# Patient Record
Sex: FEMALE | Race: WHITE | Employment: OTHER | URBAN - METROPOLITAN AREA
[De-identification: names, ages, dates, MRNs, and addresses within clinical notes are randomized per-mention and may not be internally consistent; named-entity substitution may affect disease eponyms.]

---

## 2017-10-30 PROBLEM — N64.52 NIPPLE DISCHARGE IN FEMALE: Status: ACTIVE | Noted: 2017-10-16

## 2017-12-28 PROBLEM — N64.52 NIPPLE DISCHARGE IN FEMALE: Status: RESOLVED | Noted: 2017-10-16 | Resolved: 2017-12-28

## 2018-03-22 ENCOUNTER — HOSPITAL ENCOUNTER (OUTPATIENT)
Dept: RADIATION ONCOLOGY | Age: 58
Discharge: HOME | End: 2018-03-22
Payer: COMMERCIAL

## 2018-03-22 VITALS
RESPIRATION RATE: 18 BRPM | SYSTOLIC BLOOD PRESSURE: 122 MMHG | DIASTOLIC BLOOD PRESSURE: 82 MMHG | BODY MASS INDEX: 28.69 KG/M2 | WEIGHT: 159.4 LBS

## 2018-03-22 DIAGNOSIS — C80.1 CANCER (HCC): Primary | ICD-10-CM

## 2018-03-22 PROCEDURE — 99213 OFFICE O/P EST LOW 20 MIN: CPT | Performed by: RADIOLOGY

## 2018-03-22 PROCEDURE — 99212 OFFICE O/P EST SF 10 MIN: CPT

## 2018-03-22 NOTE — PROGRESS NOTES
Win Christensen  3/22/2018  10:23 AM      Vitals:    03/22/18 1021   BP: 122/82   Resp: 18    : Wt Readings from Last 1 Encounters:   03/22/18 159 lb 6.4 oz (72.3 kg)                Current Outpatient Prescriptions:     vitamin D (ERGOCALCIFEROL) 51205 units CAPS capsule, Take 1 capsule by mouth once a week, Disp: 12 capsule, Rfl: 3    Evening Primrose Oil 1000 MG CAPS, Take 1,000 mg by mouth daily, Disp: , Rfl:     calcium carbonate (OSCAL) 500 MG TABS tablet, Take 500 mg by mouth daily , Disp: , Rfl:     Lactobacillus-Inulin (CULTUREE [employer] HEALTH PO), Take by mouth, Disp: , Rfl:     exemestane (AROMASIN) 25 MG chemo tablet, Take 25 mg by mouth daily, Disp: , Rfl:     aspirin 81 MG tablet, Take 81 mg by mouth three times a week, Disp: , Rfl:       Patient is seen today in follow up-3 month  Here for regular follow up for left breast cancer/radiation. She states she is doing good. She follows with Dr Rosalba Downing for Medical Oncology and is on Aromasin. She had a mammogram 12/28/17 that was negative. FALLS RISK SCREEN  Instructions:  Assess the patient and enter the appropriate indicators that are present for fall risk identification. Total the numbers entered and assign a fall risk score from Table 2.  Reassess patient at a minimum every 12 weeks or with status change. Assessment   Date  3/22/2018   1. Mental Ability: confusion/cognitively impaired 0 (3)   2. Elimination Issues: incontinence, frequency 0 (3)   3. Ambulatory: use of assistive devices (walker, cane, off-loading devices),        attached to equipment (IV pole, oxygen) 0 (2)   4.  Sensory Limitations: dizziness, vertigo, impaired vision 0 (3)   5. Age less than 65                 0 (0)   6. Age 72 or greater 0 (1)   7. Medication: diuretics, strong analgesics, hypnotics, sedatives,        antihypertensive agents 0 (3)   8.   Falls:  recent history of falls within the last 3 months (not to include slipping or tripping) 0 (7)   TOTAL 0  If score of 4 or greater was education given? No         TABLE 2   Risk Score Risk Level Plan of Care   0-3 Little or  No Risk 1. Provide assistance as indicated for ambulation activities  2. Reorient confused/cognitively impaired patient  3. Call-light/bell within patient's reach  4. Chair/bed in low position, stretcher/bed with siderails up except when performing patient care activities  5. Educate patient/family/caregiver on falls prevention  6.  Reassess in 12 weeks or with any noted change in patient condition which places them at a risk for a fall   4-6 Moderate Risk 1. Provide assistance as indicated for ambulation activities  2. Reorient confused/cognitively impaired patient  3. Call-light/bell within patient's reach  4. Chair/bed in low position, stretcher/bed with siderails up except when performing patient care activities  5. Educate patient/family/caregiver on falls prevention  6. Falls risk precaution (Yellow sticker Level II) placed on patient chart   7 or   Higher High Risk 1. Place patient in easily observable treatment room  2. Patient attended at all times by family member or staff  3. Provide assistance as indicated for ambulation activities  4. Reorient confused/cognitively impaired patient  5. Call-light/bell within patient's reach  6. Chair/bed in low position, stretcher/bed with siderails up except when performing patient care activities  7. Educate patient/family/caregiver on falls prevention  8. Falls risk precaution (Yellow sticker Level III) placed on patient chart       NUTRITION RISK SCREEN    3/22/2018   Patient:  dEi Cazares  Sex:  female    NUTRITION RISK SCREEN  Instructions:  Assess the patient and enter the appropriate indicators that are present for nutrition risk identification. Total the numbers entered and assign a risk score. Follow the appropriate action for total score listed below.      Assessment   Date  3/22/2018 1. Poor appetite?--a. One week or greater (1)                       b. One month or greater (2) 0        2. Unplanned wt loss?--a. Greater than 5# in 1 week(1)                                  b. Greater than 10# in 1 month (2)                                  c. Greater than 20# in 6 mos (1) 0        3. Diagnosis of Head or Neck Cancer? (2)   0    4. If yes, difficulty swallowing? (1) 0        5. Receiving nutrition via feeding tube or parenteral nutrition? (1) 0        6. If yes, report of problems with feeding tube? (1) 0      TOTAL 0         Score of 0-1: No action  Score 2 or greater:  1. For Non-Diabetic Patient: Recommend adding Ensure Complete 2xdaily and provide              patient with Ensure wellness bag with coupons; For Diabetic Patient, Recommend adding Glucerna Shake 2xdaily and provide patient with Glucerna Wellness bag with coupons  2.  Route to the dietitian via [address] [place] [address]

## 2018-03-22 NOTE — PROGRESS NOTES
breast cancer:  cousin female, breast cancer. The patient has a history of    left Stereotactic Biopsy in December, 2016 - dcis.       MAMMOGRAM VIEWS:   The following mammographic views where obtained: bilateral craniocaudal; bilateral craniocaudal with tomosynthesis; bilateral mediolateral oblique; and bilateral mediolateral oblique with tomosynthesis       TOMOSYNTHESIS:   Tomosynthesis (3 Dimensional Breast Imaging) was used on this examination to aid in evaluation.       COMPARISON:   The present examination has been compared to prior imaging studies performed at [facility] on 12/27/2016 and 10/30/2017.       CAD:   This exam was reviewed using the [employer] Computer Aided Detection (CAD)       TISSUE DENSITY:   There are scattered fibroglandular densities (Type 2 density).       MAMMOGRAM FINDINGS:   Finding 1:   There is an area of post-lumpectomy change seen in the left breast.       No suspicious masses, areas of suspicious architectural distortion, suspicious calcifications, or additional suspicious findings are identified.       IMPRESSION:   Area of post-lumpectomy change in the left breast is benign.       Screening mammogram in 1 year is recommended.       =======================================   BI-RADS Category 2:  Benign   =======================================                -----       RADON TX SUMM 2177 Manny Allred MD:        Early stage L breast CA (Tis) s/p BCS< RT as below        Katia Kilpatrick completed radiation treatments to the left whole breast with mixed mode photons using a FiF/ [place] technique at [facility].   The patient received a total dose of 42.56 in 16 fractions + conformal LSB.     Treatment Start Date:  3/29/17  Treatment Completion Date: 4/28/17     The treatments were well tolerated, without significant interruption.      RTOG Acute Toxicity Grade [ARMSC]: 1-2. (skin and fatigue)        -----    Pathology reviewed:     Initial BCS site  Ancillary Studies:  ER performed on original biopsy [facility]  #[id number], [facility] #[id number]), positive (90%, moderate intensity)  Microcalcifications:  A few small calcifications present in DCIS and  areas of fibrocystic change  Clinical History:  Not known        Re-excision PATH 2/10/17:  Diagnosis:  Left breast designated \"new superior-anterior margin\", wide local  excision:     * Negative for residual malignancy.     * Focal fibrocystic changes.     * Focal proliferative epithelial changes including ductal epithelial       hyperplasia of usual type and adenosis.     * Histologic changes consistent with healing biopsy site.         BIOP 12/5/17:  Diagnosis:  Left breast, retroareolar region, microcalcifications, core needle biopsy  (6 slides from [address] [place] [address] number: [id number])       - Ductal carcinoma in situ, high nuclear grade, comedo and solid         type with necrosis;       - Microcalcifications associated with DCIS;       - Estrogen receptors (ER): Positive, > 90% nuclear staining,         moderate staining intensity.       -----         Past Medical History:   Diagnosis Date    Cancer ([address]     left    mammogram 11/03/2016    erick Kingsley-scattered fibroglandular tissue bilaterally-follow up spot compression is recommended    Plantar fascia syndrome          Past Surgical History:   Procedure Laterality Date    BREAST SURGERY Left 02/10/2017    Excision left breast anterior superior margins with Dr. Fabio Manley COLONOSCOPY  2012    ENDOMETRIAL ABLATION      WISDOM TOOTH EXTRACTION           Social History     Social History    Marital status:      Spouse name: N/A    Number of children: N/A    Years of education: N/A     Occupational History    Not on file.      Social History Main Topics    Smoking status: Never Smoker    Smokeless tobacco: Never Used    Alcohol use No    Drug use: No    Sexual activity: Yes     Partners: Male     Other Topics Concern    Not on file     Social History Narrative    Lives in [place], works as a groomer. Family History   Problem Relation Age of Onset    Cancer Father 47     bladder    Cancer Maternal Grandmother [de-identified]     pancreatic    Cancer Maternal Grandfather 79     lung    Cancer Paternal Cousin 54     breast       Allergies:   Dust mite extract and Molds & smuts      Current Outpatient Prescriptions   Medication Sig Dispense Refill    vitamin D (ERGOCALCIFEROL) 81998 units CAPS capsule Take 1 capsule by mouth once a week 12 capsule 3    Evening Primrose Oil 1000 MG CAPS Take 1,000 mg by mouth daily      calcium carbonate (OSCAL) 500 MG TABS tablet Take 500 mg by mouth daily       Lactobacillus-Inulin ([employer] HEALTH PO) Take by mouth      exemestane (AROMASIN) 25 MG chemo tablet Take 25 mg by mouth daily      aspirin 81 MG tablet Take 81 mg by mouth three times a week       No current facility-administered medications for this encounter. REVIEW OF SYSTEMS  History obtained from chart review and the patient  General ROS: negative  Psychological ROS: negative  Ophthalmic ROS: negative  ENT ROS: negative  Allergy and Immunology ROS: negative  Hematological and Lymphatic ROS: negative  Endocrine ROS: negative  Breast ROS: negative for breast lumps  Respiratory ROS: no cough, shortness of breath, or wheezing  Cardiovascular ROS: no chest pain or dyspnea on exertion  Gastrointestinal ROS: no abdominal pain, change in bowel habits, or black or bloody stools  Genito-Urinary ROS: no dysuria, trouble voiding, or hematuria  Musculoskeletal ROS: negative  Neurological ROS: no TIA or stroke symptoms  Dermatological ROS: negative        Physical Exam   Constitutional: She is oriented to person, place, and time and well-developed, well-nourished, and in no distress. HENT:   Head: Normocephalic. Eyes: Conjunctivae are normal. Pupils are equal, round, and reactive to light.    Neck: Normal range of motion. Neck supple. Cardiovascular: Normal rate. Pulmonary/Chest: Effort normal and breath sounds normal.   Abdominal: Soft. Bowel sounds are normal.   Musculoskeletal: Normal range of motion. She exhibits no edema. Neurological: She is alert and oriented to person, place, and time. Skin: Skin is warm and dry. Psychiatric: Mood, memory, affect and judgment normal.           A/P:        Herve Diaz is doing well post adjuvant fractionated external beam radiation therapy for left breast DCIS (after BCS). There is no evidence of disease recurrence or progression based on a detailed review of the available imaging, physical exam, and ROS (all personally performed prior to and during this follow up appointment today). The patient did verbalize understanding for the indications of continued FU including imaging and laboratory evaluation as applicable to this case; as well as appropriate lifestyle choices and health maintenance. All questions answered to the best of my ability. Early delayed or chronic RT grade 1 (skin).          *I spent at least 35 on this case and with this patient today including personally performing/reviewing the history, ROS, PE, and providing a summary and description of the detailed medical decision making as a basis for any and all recommendations made today (+/- a pertinent RBA discussion): literature and radiology reviews were performed as noted and applicable (61% or more time was spent in direct patient ).          -needs Mgm 6 months post RT (recent NEG, next is ordered per TM)  -also follows with Dr. Felicita Contreras. Jenna Acosta MD [place] [address] Oncology  Cell: [phone]  [facility]:  [phone]   FAX: [phone]  [address] [place] [address]:   [address] [place] [address] Avenue:    [phone]  [address] [place] [address] Road:  [phone]   FAX:  [phone]  Email: [email or web address]      NOTE: This report was transcribed using voice recognition software.  Every effort was made to ensure accuracy; however, inadvertent computerized transcription errors may be present.

## 2018-06-21 ASSESSMENT — ENCOUNTER SYMPTOMS
SINUS PAIN: 0
TROUBLE SWALLOWING: 0
ABDOMINAL DISTENTION: 0
SINUS PRESSURE: 0
ABDOMINAL PAIN: 0
EYE DISCHARGE: 0
RHINORRHEA: 0
VOMITING: 0
SHORTNESS OF BREATH: 0
EYE ITCHING: 0
CHEST TIGHTNESS: 0
WHEEZING: 0
VOICE CHANGE: 0
DIARRHEA: 0
CHOKING: 0
BLOOD IN STOOL: 0
COUGH: 0
BACK PAIN: 0
SORE THROAT: 0
CONSTIPATION: 0
NAUSEA: 0

## 2018-06-28 ENCOUNTER — OFFICE VISIT (OUTPATIENT)
Dept: BREAST CENTER | Age: 58
End: 2018-06-28
Payer: COMMERCIAL

## 2018-06-28 VITALS
RESPIRATION RATE: 16 BRPM | TEMPERATURE: 98.4 F | HEIGHT: 63 IN | DIASTOLIC BLOOD PRESSURE: 80 MMHG | WEIGHT: 153.5 LBS | HEART RATE: 59 BPM | SYSTOLIC BLOOD PRESSURE: 120 MMHG | OXYGEN SATURATION: 98 % | BODY MASS INDEX: 27.2 KG/M2

## 2018-06-28 DIAGNOSIS — Z85.3 PERSONAL HISTORY OF BREAST CANCER: ICD-10-CM

## 2018-06-28 DIAGNOSIS — D05.12 DUCTAL CARCINOMA IN SITU (DCIS) OF LEFT BREAST: Primary | ICD-10-CM

## 2018-06-28 PROCEDURE — 99213 OFFICE O/P EST LOW 20 MIN: CPT | Performed by: NURSE PRACTITIONER

## 2018-10-02 ENCOUNTER — HOSPITAL ENCOUNTER (OUTPATIENT)
Dept: RADIATION ONCOLOGY | Age: 58
Discharge: HOME | End: 2018-10-02
Payer: COMMERCIAL

## 2018-10-02 VITALS
WEIGHT: 156 LBS | HEART RATE: 72 BPM | RESPIRATION RATE: 18 BRPM | SYSTOLIC BLOOD PRESSURE: 132 MMHG | BODY MASS INDEX: 28.08 KG/M2 | DIASTOLIC BLOOD PRESSURE: 80 MMHG

## 2018-10-02 DIAGNOSIS — D05.12 DUCTAL CARCINOMA IN SITU (DCIS) OF LEFT BREAST: Primary | ICD-10-CM

## 2018-10-02 PROCEDURE — 99213 OFFICE O/P EST LOW 20 MIN: CPT | Performed by: NURSE PRACTITIONER

## 2018-10-10 ENCOUNTER — HOSPITAL ENCOUNTER (OUTPATIENT)
Age: 58
Discharge: HOME | End: 2018-10-12
Payer: COMMERCIAL

## 2018-10-10 PROCEDURE — 88175 CYTOPATH C/V AUTO FLUID REDO: CPT

## 2018-12-31 ENCOUNTER — HOSPITAL ENCOUNTER (OUTPATIENT)
Dept: GENERAL RADIOLOGY | Age: 58
End: 2018-12-31
Payer: COMMERCIAL

## 2018-12-31 ENCOUNTER — HOSPITAL ENCOUNTER (OUTPATIENT)
Dept: GENERAL RADIOLOGY | Age: 58
Discharge: HOME | End: 2019-01-02
Payer: COMMERCIAL

## 2018-12-31 DIAGNOSIS — Z12.31 SCREENING MAMMOGRAM, ENCOUNTER FOR: ICD-10-CM

## 2018-12-31 DIAGNOSIS — D05.12 DUCTAL CARCINOMA IN SITU (DCIS) OF LEFT BREAST: ICD-10-CM

## 2018-12-31 PROCEDURE — 77063 BREAST TOMOSYNTHESIS BI: CPT

## 2019-01-02 DIAGNOSIS — Z78.0 POSTMENOPAUSAL: Primary | ICD-10-CM

## 2019-01-11 ASSESSMENT — ENCOUNTER SYMPTOMS
SHORTNESS OF BREATH: 0
CHOKING: 0
CHEST TIGHTNESS: 0
EYE ITCHING: 0
SINUS PAIN: 0
BACK PAIN: 0
ABDOMINAL PAIN: 0
VOMITING: 0
BLOOD IN STOOL: 0
COUGH: 0
CONSTIPATION: 0
SORE THROAT: 0
RHINORRHEA: 0
EYE DISCHARGE: 0
NAUSEA: 0
VOICE CHANGE: 0
DIARRHEA: 0
TROUBLE SWALLOWING: 0
ABDOMINAL DISTENTION: 0
WHEEZING: 0
SINUS PRESSURE: 0

## 2019-01-17 ENCOUNTER — OFFICE VISIT (OUTPATIENT)
Dept: BREAST CENTER | Age: 59
End: 2019-01-17
Payer: COMMERCIAL

## 2019-01-17 ENCOUNTER — HOSPITAL ENCOUNTER (OUTPATIENT)
Dept: GENERAL RADIOLOGY | Age: 59
Discharge: HOME | End: 2019-01-19
Payer: COMMERCIAL

## 2019-01-17 VITALS
BODY MASS INDEX: 27.82 KG/M2 | DIASTOLIC BLOOD PRESSURE: 84 MMHG | WEIGHT: 157 LBS | SYSTOLIC BLOOD PRESSURE: 138 MMHG | TEMPERATURE: 98 F | HEIGHT: 63 IN | RESPIRATION RATE: 16 BRPM | HEART RATE: 59 BPM | OXYGEN SATURATION: 98 %

## 2019-01-17 DIAGNOSIS — Z78.0 POSTMENOPAUSAL: ICD-10-CM

## 2019-01-17 DIAGNOSIS — D05.12 DUCTAL CARCINOMA IN SITU (DCIS) OF LEFT BREAST: Primary | ICD-10-CM

## 2019-01-17 PROCEDURE — 77080 DXA BONE DENSITY AXIAL: CPT

## 2019-01-17 PROCEDURE — 99213 OFFICE O/P EST LOW 20 MIN: CPT | Performed by: NURSE PRACTITIONER

## 2019-01-17 RX ORDER — CHLORAL HYDRATE 500 MG
CAPSULE ORAL DAILY
COMMUNITY

## 2019-04-26 ENCOUNTER — HOSPITAL ENCOUNTER (OUTPATIENT)
Dept: RADIATION ONCOLOGY | Age: 59
Discharge: HOME | End: 2019-04-26
Payer: COMMERCIAL

## 2019-04-26 VITALS
SYSTOLIC BLOOD PRESSURE: 130 MMHG | RESPIRATION RATE: 16 BRPM | TEMPERATURE: 97.7 F | HEART RATE: 74 BPM | DIASTOLIC BLOOD PRESSURE: 78 MMHG | BODY MASS INDEX: 27.9 KG/M2 | WEIGHT: 157.5 LBS

## 2019-04-26 DIAGNOSIS — C80.1 CANCER (HCC): Primary | ICD-10-CM

## 2019-04-26 PROCEDURE — 99212 OFFICE O/P EST SF 10 MIN: CPT

## 2019-04-26 PROCEDURE — 99213 OFFICE O/P EST LOW 20 MIN: CPT | Performed by: RADIOLOGY

## 2019-04-26 NOTE — PROGRESS NOTES
Radiation Oncology   Follow Up Note  Percy Vela. Simba Self MD MS DABR      2019  Coco Reardon  Date of Service: 19        HPI:           Mrs. Coco Reardon is a pleasant and articulate 55year old  who presents today for another  FU post RT.                       The microcalcifications were located in the retro areolar position of the left breast. Patient did not routinely do self breast exams. Patient reported small amount of yellow/green nipple discharge ever since she breast fed. The discharge only occurred with pressure. Breast cancer risk factors included family hx on father's side, first cousin ---Possibly also that cousin's Mother. Carmen Black father who was a smoker had bladder cancer. Maternal GF with lung cancer, smoker. MGM with late onset pancreatic cancer.  Age of menarche was 14. Patient had a uterine ablation 5-6 years ago. Davie Sharma denies hormonal therapy. Patient is . Age of first live birth was 35. Patient did breast feed. She underwent a stereotactic left breast core biopsy at the retro areolar position with clip placement on . [facility] demonstrated DCIS. Estrogen Receptor positive. Patient denied prior breast biopsy. BCS and re-resection performed. DCIS only but high grade. Course reviewed prior to RT and she tolerated fractionated external beam radiation therapy very well (as below).  She has been compliant anti - estrogen therapy (SE - hot flashes).  Franca states that the left breat is still mildly tender but there are no masses, lesions or discharge. Kristin Zhou is now doing self breast exams more frequently and she still notes no changes. No new Sx on this last interval.  KPS 90+.         Mgm 18:      IMPRESSION:   No mammographic evidence of malignancy.       Screening mammogram in 1 year is recommended.       =======================================   BI-RADS Category 1:  Negative LSB.     Treatment Start Date:  3/29/17  Treatment Completion Date: 4/28/17     The treatments were well tolerated, without significant interruption.      RTOG Acute Toxicity Grade [ARMSC]: 1-2. (skin and fatigue)          -----         Pathology reviewed:     Initial BCS 1/12/17  Diagnosis:     A. Soft tissue mass, right shoulder, excision:  Lipoma.     B. Skin lesions, excision:  Acrochordons (skin tags), one with small       intradermal nevus.     C. Left breast tissue, wire-guided excision (lumpectomy):       - Scattered foci of residual high-grade ductal carcinoma in situ,         predominantly solid type with focal comedo necrosis, focally         involving anterior (green ink) margin of excision and focally         extending to within 1 mm of jarvis-superior (green/red ink) margin         of excision.       - Recent biopsy site with organizing hematoma/fat necrosis.      - Proliferative fibrocystic changes.     D. Left breast tissue, anterior margin, excision:  Breast tissue with       fibrocystic changes and peripheral electro thermal artifact, negative       for residual ductal carcinoma in situ.     Comment:     The wire guided lumpectomy specimen contains numerous foci  of residual high-grade ductal carcinoma in situ with focal cancerization  of lobules.  Ductal carcinoma in situ is focally present at the anterior  margin and is present within 1 mm of the adjacent anterosuperior margin  of excision.    The additional anterior margin tissue is negative for  ductal carcinoma in situ.                 CANCER CASE SUMMARY  Procedure:  Lumpectomy with additional anterior margin  Lymph Node Sampling:  Not performed  Specimen Laterality:  Left  Tumor Site:  Retroareolar  Size (Extent) of DCIS:  Multifocal, DCIS present in 7 of 23 sections  Histologic Type:  High-grade DCIS  Architectural Patterns :  Predominantly solid, with small foci of  micropapillary  Nuclear thGthrthathdtheth:th th4th Necrosis:  Focal comedo necrosis present  Margins:  DCIS focally present at anterior margin (green ink)  Treatment Effect:  Recent biopsy site present  Lymph Nodes:  No lymph node sampling  Pathologic Staging:   pTis  Additional Pathologic Findings:  Proliferative fibrocystic changes,  recent biopsy site  Ancillary Studies:  ER performed on original biopsy [facility]  #[id number], [facility] #[id number]), positive (90%, moderate intensity)  Microcalcifications:  A few small calcifications present in DCIS and  areas of fibrocystic change  Clinical History:  Not known          Re-excision PATH 2/10/17:  Diagnosis:  Left breast designated \"new superior-anterior margin\", wide local  excision:     * Negative for residual malignancy.     * Focal fibrocystic changes.     * Focal proliferative epithelial changes including ductal epithelial       hyperplasia of usual type and adenosis.     * Histologic changes consistent with healing biopsy site.           BIOP 12/5/17:  Diagnosis:  Left breast, retroareolar region, microcalcifications, core needle biopsy  (6 slides from [address] [place] [address] number: [id number])       - Ductal carcinoma in situ, high nuclear grade, comedo and solid         type with necrosis;       - Microcalcifications associated with DCIS;       - Estrogen receptors (ER): Positive, > 90% nuclear staining,         moderate staining intensity.          -----                  Past Medical History:   Diagnosis Date    Cancer ([address]     left    Carpal tunnel syndrome of right wrist 07/2018    mammogram 11/03/2016    erick Kingsley-scattered fibroglandular tissue bilaterally-follow up spot compression is recommended    Plantar fascia syndrome          Past Surgical History:   Procedure Laterality Date    BREAST SURGERY Left 02/10/2017    Excision left breast anterior superior margins with Dr. Evelyn Bailey Right 11/27/2018    COLONOSCOPY  2012    ENDOMETRIAL ABLATION      WISDOM TOOTH EXTRACTION mg by mouth daily      Elastic Bandages & Supports (ACE WRIST BRACE) MISC 1 Units by Does not apply route daily 1 each 0    Evening Primrose Oil 1000 MG CAPS Take 1,000 mg by mouth daily      calcium carbonate (OSCAL) 500 MG TABS tablet Take 500 mg by mouth 2 times daily       exemestane (AROMASIN) 25 MG chemo tablet Take 25 mg by mouth daily      aspirin 81 MG tablet Take 81 mg by mouth three times a week       No current facility-administered medications for this encounter. REVIEW OF SYSTEMS  History obtained from chart review and the patient  General ROS: negative  Psychological ROS: negative  Ophthalmic ROS: negative  ENT ROS: ringing in the ears  Allergy and Immunology ROS: negative  Hematological and Lymphatic ROS: negative  Endocrine ROS: negative  Breast ROS: negative for breast lumps  Respiratory ROS: no cough, shortness of breath, or wheezing  Cardiovascular ROS: no chest pain or dyspnea on exertion  Gastrointestinal ROS: no abdominal pain, change in bowel habits, or black or bloody stools  Genito-Urinary ROS: no dysuria, trouble voiding, or hematuria  Musculoskeletal ROS: negative  Neurological ROS: no TIA or stroke symptoms  Dermatological ROS: negative      Physical Exam   Constitutional: She is oriented to person, place, and time. She appears well-developed and well-nourished. HENT:   Head: Normocephalic. Eyes: Pupils are equal, round, and reactive to light. Neck: Normal range of motion. Cardiovascular: Normal rate, regular rhythm and normal heart sounds. Pulmonary/Chest: Effort normal.   Abdominal: Soft. Musculoskeletal: Normal range of motion. She exhibits no edema. Neurological: She is alert and oriented to person, place, and time. Skin: Skin is warm and dry. Psychiatric: She has a normal mood and affect.  Her behavior is normal. Judgment and thought content normal.         A/P:             Altagracia Kenney is doing well post adjuvant fractionated external beam radiation therapy

## 2019-04-26 NOTE — PROGRESS NOTES
Makayla Begum  4/26/2019  9:45 AM      Vitals:    04/26/19 0944   BP: 130/78   Pulse: 74   Resp: 16   Temp: 97.7 °F (36.5 °C)    : Wt Readings from Last 3 Encounters:   04/26/19 157 lb 8 oz (71.4 kg)   01/17/19 157 lb (71.2 kg)   10/10/18 157 lb (71.2 kg)                Current Outpatient Medications:     vitamin D (ERGOCALCIFEROL) 09144 units CAPS capsule, TAKE 1 CAPSULE ONCE WEEKLY (PATIENT NEEDS APPOINTMENT), Disp: 12 capsule, Rfl: 0    Omega-3 1000 MG CAPS, Take by mouth daily, Disp: , Rfl:     MAGNESIUM GLUCONATE PO, Take 150 mg by mouth daily, Disp: , Rfl:     Elastic Bandages & Supports (ACE WRIST BRACE) MISC, 1 Units by Does not apply route daily, Disp: 1 each, Rfl: 0    Evening Primrose Oil 1000 MG CAPS, Take 1,000 mg by mouth daily, Disp: , Rfl:     calcium carbonate (OSCAL) 500 MG TABS tablet, Take 500 mg by mouth 2 times daily , Disp: , Rfl:     exemestane (AROMASIN) 25 MG chemo tablet, Take 25 mg by mouth daily, Disp: , Rfl:     aspirin 81 MG tablet, Take 81 mg by mouth three times a week, Disp: , Rfl:       Patient is seen today in follow up for breast cancer tx completed 4/28/17  She continues on aromasin w/o complaints  Dr Claudeen Regal next month        FALLS RISK SCREENING ASSESSMENT    Instructions:  Assess the patient and enter the appropriate indicators that are present for fall risk identification. Total the numbers entered and assign a fall risk score from Table 2.  Reassess patient at a minimum every 12 weeks or with status change. Assessment   Date  4/26/2019     1. Mental Ability: confusion/cognitively impaired No - 0       2. Elimination Issues: incontinence, frequency No - 0       3. Ambulatory: use of assistive devices (walker, cane, off-loading devices), attached to equipment (IV pole, oxygen) No - 0     4. Sensory Limitations: dizziness, vertigo, impaired vision No - 0       5. Age Less than 65 years - 0       6.   Medication: diuretics, strong analgesics, hypnotics, sedatives, antihypertensive agents   No - 0   7. Falls:  recent history of falls within the last 3 months (not to include slipping or tripping)   No - 0   TOTAL 0    If score of 4 or greater was education given? No       TABLE 2   Risk Score Risk Level Plan of Care   0-3 Little or  No Risk 1. Provide assistance as indicated for ambulation activities  2. Reorient confused/cognitively impaired patient  3. Call-light/bell within patient's reach  4. Chair/bed in low position, stretcher/bed with siderails up except when performing patient care activities  5. Educate patient/family/caregiver on falls prevention  6.  Reassess in 12 weeks or with any noted change in patient condition which places them at a risk for a fall   4-6 Moderate Risk 1. Provide assistance as indicated for ambulation activities  2. Reorient confused/cognitively impaired patient  3. Call-light/bell within patient's reach  4. Chair/bed in low position, stretcher/bed with siderails up except when performing patient care activities  5. Educate patient/family/caregiver on falls prevention  6. Falls risk precaution (Yellow sticker Level II) placed on patient chart   7 or   Higher High Risk 1. Place patient in easily observable treatment room  2. Patient attended at all times by family member or staff  3. Provide assistance as indicated for ambulation activities  4. Reorient confused/cognitively impaired patient  5. Call-light/bell within patient's reach  6. Chair/bed in low position, stretcher/bed with siderails up except when performing patient care activities  7. Educate patient/family/caregiver on falls prevention  8. Falls risk precaution (Yellow sticker Level III) placed on patient chart           MALNUTRITION RISK SCREENING ASSESSMENT    4/26/2019   Patient:  Shelley Ricks  Sex:  female    Instructions:  Assess the patient and enter the appropriate indicators that are present for nutrition risk identification.  Total the numbers entered and assign a risk score. Follow the appropriate action for total score listed below. Assessment   Date  4/26/2019     1. Have you lost weight without trying? 0- No     2. Have you been eating poorly because of a decreased appetite? 0- No   3. Do you have a diagnosis of head and neck cancer?       0- No                                                                                    TOTAL 0          Score of 0-1: No action  Score 2 or greater:  · For Non-Diabetic Patient: Recommend adding Ensure Complete 2 x daily and provide patient with Ensure wellness bag with coupons  · For Diabetic Patient: Recommend adding Glucerna Shake 2 x daily and provide patient with Glucerna Wellness bag with coupons  · Route to the dietitian via [address] [place] [address]

## 2019-10-10 ENCOUNTER — HOSPITAL ENCOUNTER (OUTPATIENT)
Age: 59
Discharge: HOME | End: 2019-10-12
Payer: COMMERCIAL

## 2019-10-10 PROCEDURE — 87624 HPV HI-RISK TYP POOLED RSLT: CPT

## 2019-10-10 PROCEDURE — 88175 CYTOPATH C/V AUTO FLUID REDO: CPT

## 2019-10-15 LAB
HPV SAMPLE: NORMAL
HPV TYPE 16: NOT DETECTED
HPV TYPE 18: NOT DETECTED
HPV, HIGH RISK OTHER: NOT DETECTED
INTERPRETATION: NORMAL
SOURCE: NORMAL

## 2019-11-14 ENCOUNTER — HOSPITAL ENCOUNTER (OUTPATIENT)
Age: 59
Discharge: HOME | End: 2019-11-16
Payer: COMMERCIAL

## 2019-11-14 DIAGNOSIS — D05.12 DUCTAL CARCINOMA IN SITU OF LEFT BREAST: ICD-10-CM

## 2019-11-14 DIAGNOSIS — N64.52 DISCHARGE FROM RIGHT NIPPLE: ICD-10-CM

## 2019-11-14 DIAGNOSIS — N64.4 BREAST PAIN, RIGHT: Primary | ICD-10-CM

## 2019-11-14 DIAGNOSIS — D05.12 DUCTAL CARCINOMA IN SITU (DCIS) OF LEFT BREAST: ICD-10-CM

## 2019-11-14 DIAGNOSIS — N64.4 BREAST PAIN, RIGHT: ICD-10-CM

## 2019-11-14 LAB
PROLACTIN: 8.03 NG/ML
TSH SERPL DL<=0.05 MIU/L-ACNC: 1.27 UIU/ML (ref 0.27–4.2)

## 2019-11-14 PROCEDURE — 84146 ASSAY OF PROLACTIN: CPT

## 2019-11-14 PROCEDURE — 84443 ASSAY THYROID STIM HORMONE: CPT

## 2019-11-15 ENCOUNTER — APPOINTMENT (OUTPATIENT)
Dept: RADIATION ONCOLOGY | Age: 59
End: 2019-11-15
Payer: COMMERCIAL

## 2019-11-20 ENCOUNTER — APPOINTMENT (OUTPATIENT)
Dept: RADIATION ONCOLOGY | Age: 59
End: 2019-11-20
Payer: COMMERCIAL

## 2019-11-21 ENCOUNTER — HOSPITAL ENCOUNTER (OUTPATIENT)
Dept: GENERAL RADIOLOGY | Age: 59
Discharge: HOME | End: 2019-11-23
Payer: COMMERCIAL

## 2019-11-21 DIAGNOSIS — N64.4 BREAST PAIN, RIGHT: ICD-10-CM

## 2019-11-21 DIAGNOSIS — D05.12 DUCTAL CARCINOMA IN SITU OF LEFT BREAST: ICD-10-CM

## 2019-11-21 DIAGNOSIS — D05.12 DUCTAL CARCINOMA IN SITU (DCIS) OF LEFT BREAST: ICD-10-CM

## 2019-11-21 DIAGNOSIS — N64.52 DISCHARGE FROM RIGHT NIPPLE: ICD-10-CM

## 2019-11-21 PROCEDURE — G0279 TOMOSYNTHESIS, MAMMO: HCPCS

## 2019-11-21 PROCEDURE — 76642 ULTRASOUND BREAST LIMITED: CPT

## 2019-12-05 ENCOUNTER — HOSPITAL ENCOUNTER (OUTPATIENT)
Dept: RADIATION ONCOLOGY | Age: 59
Discharge: HOME | End: 2019-12-05
Payer: COMMERCIAL

## 2019-12-05 VITALS
RESPIRATION RATE: 18 BRPM | TEMPERATURE: 98.6 F | BODY MASS INDEX: 28.65 KG/M2 | WEIGHT: 159.19 LBS | HEART RATE: 59 BPM | DIASTOLIC BLOOD PRESSURE: 80 MMHG | SYSTOLIC BLOOD PRESSURE: 132 MMHG | OXYGEN SATURATION: 97 %

## 2019-12-05 DIAGNOSIS — D05.12 DUCTAL CARCINOMA IN SITU (DCIS) OF LEFT BREAST: Primary | ICD-10-CM

## 2019-12-05 PROCEDURE — 99212 OFFICE O/P EST SF 10 MIN: CPT

## 2019-12-05 PROCEDURE — 99213 OFFICE O/P EST LOW 20 MIN: CPT | Performed by: NURSE PRACTITIONER

## 2020-04-28 ENCOUNTER — TELEPHONE (OUTPATIENT)
Dept: BREAST CENTER | Age: 60
End: 2020-04-28

## 2020-06-01 ASSESSMENT — ENCOUNTER SYMPTOMS
CHEST TIGHTNESS: 0
VOICE CHANGE: 0
EYE DISCHARGE: 0
SHORTNESS OF BREATH: 0
ABDOMINAL DISTENTION: 0
SORE THROAT: 0
BACK PAIN: 0
TROUBLE SWALLOWING: 0
ABDOMINAL PAIN: 0
RHINORRHEA: 0
NAUSEA: 0
DIARRHEA: 0
EYE ITCHING: 0
COUGH: 0
SINUS PAIN: 0
CHOKING: 0
CONSTIPATION: 0
WHEEZING: 0
VOMITING: 0
SINUS PRESSURE: 0
BLOOD IN STOOL: 0

## 2020-06-01 NOTE — PROGRESS NOTES
Evening Primrose Oil 1000 MG CAPS Take 1,000 mg by mouth daily      calcium carbonate (OSCAL) 500 MG TABS tablet Take 500 mg by mouth 2 times daily       exemestane (AROMASIN) 25 MG chemo tablet Take 25 mg by mouth daily      aspirin 81 MG tablet Take 81 mg by mouth three times a week       No current facility-administered medications on file prior to visit. Review of Systems   Constitutional: Negative for activity change, appetite change, chills, fatigue, fever and unexpected weight change. Reports her mother was just diagnosed with cervical cancer and lung tumor (adenoid cystic cancer). HENT: Negative for congestion, postnasal drip, rhinorrhea, sinus pressure, sinus pain, sneezing, sore throat, trouble swallowing and voice change. Eyes: Negative for discharge, itching and visual disturbance. Respiratory: Negative for cough, choking, chest tightness, shortness of breath and wheezing. Cardiovascular: Negative for chest pain, palpitations and leg swelling. Gastrointestinal: Negative for abdominal distention, abdominal pain, blood in stool, constipation, diarrhea, nausea and vomiting. Endocrine: Negative for cold intolerance and heat intolerance. Genitourinary: Negative for difficulty urinating, dysuria, frequency and hematuria. Musculoskeletal: Positive for arthralgias. Negative for back pain, gait problem, joint swelling, myalgias, neck pain and neck stiffness. Stable compared to baseline     Allergic/Immunologic: Negative for environmental allergies and food allergies. Neurological: Negative for dizziness, seizures, syncope, speech difficulty, weakness, light-headedness and headaches. Hematological: Negative for adenopathy. Does not bruise/bleed easily. Psychiatric/Behavioral: Negative for agitation, confusion and decreased concentration. The patient is not nervous/anxious. Objective:   Physical Exam  Vitals signs and nursing note reviewed.    Constitutional: spontaneous, dark green/bluish nipple discharge in mid October 2017. Resolved spontaneously. Questionably r/t AI. Work-up negative. Again, no evidence of recurrence since October 2017. Plan:      1. Continue monthly breast self examination. Bring any changes to your physician's attention. 2. Continue Aromasin 25 mg daily as per Medical Oncology. 3. Calcium and vitamin D daily while on Aromasin. 4. Repeat mammogram November 2020 (ordered). 5. Repeat Bone density June 2021 after the 3rd  6. Continue follow up with Medical Oncology ([facility]. [facility]), Radiation Oncology, and Primary Care. 7. RTC November 2020 with mammogram same day. During today's visit, face-to-face time 15 minutes, greater than 50% in counseling education and coordination of care. All questions were answered to her and her mothers apparent satisfaction, and she is agreeable to the plan as outlined above. Care in collaboration with Dr. Brady Bar. Chester Pickard, RN, MSN, ACNP-BC, AOCNP  Advanced Oncology Certified Nurse Practitioner  Department of Breast Surgery  [facility]/  Care in collaboration with Dr. Brady Bar.  [facility]  Deepali 3, 2020

## 2020-06-03 ENCOUNTER — HOSPITAL ENCOUNTER (OUTPATIENT)
Dept: GENERAL RADIOLOGY | Age: 60
Discharge: HOME | End: 2020-06-05
Payer: COMMERCIAL

## 2020-06-03 ENCOUNTER — OFFICE VISIT (OUTPATIENT)
Dept: BREAST CENTER | Age: 60
End: 2020-06-03
Payer: COMMERCIAL

## 2020-06-03 VITALS
RESPIRATION RATE: 18 BRPM | HEART RATE: 61 BPM | HEIGHT: 63 IN | BODY MASS INDEX: 28.7 KG/M2 | SYSTOLIC BLOOD PRESSURE: 116 MMHG | OXYGEN SATURATION: 98 % | DIASTOLIC BLOOD PRESSURE: 62 MMHG | TEMPERATURE: 97.7 F | WEIGHT: 162 LBS

## 2020-06-03 PROCEDURE — 77080 DXA BONE DENSITY AXIAL: CPT

## 2020-06-03 PROCEDURE — 99213 OFFICE O/P EST LOW 20 MIN: CPT | Performed by: NURSE PRACTITIONER

## 2020-07-10 ENCOUNTER — HOSPITAL ENCOUNTER (OUTPATIENT)
Dept: RADIATION ONCOLOGY | Age: 60
Discharge: HOME | End: 2020-07-10
Payer: COMMERCIAL

## 2020-07-10 VITALS — BODY MASS INDEX: 28.62 KG/M2 | WEIGHT: 159 LBS

## 2020-07-10 PROCEDURE — 99442 PR PHYS/QHP TELEPHONE EVALUATION 11-20 MIN: CPT | Performed by: RADIOLOGY

## 2020-07-10 NOTE — PATIENT INSTRUCTIONS
MACK Avila. MD [place] [address] Oncology  Cell: [phone]    [facility]ITAL:  [phone]   FAX: [phone] [address] [facility]:  [address] [place] [address] Avenue:    [phone]  [address] [place] [address] Road:  [phone]   FAX:  [phone]  Email: [email or web address]

## 2020-07-10 NOTE — PROGRESS NOTES
Radiation Oncology   Follow Up Note  Stone Mack. Chip Parsons MD MS DABR      7/10/2020  Osman Clemens  Date of Service: 7/10/20        HPI:        -DIAG:   Breast cancer  -TX:   BCS and adjuvant RT  -Interval HIST:  Today, Renee Ford notes not mass, lesions, discharge or pain. She has no LE.  KPS . Imaging reviewed prior and during visit:       Mg 11/21/19: IMPRESSION:   Finding 1:  No mammographic evidence of malignancy.       Finding 2:  There is no sonographic evidence of malignancy.       Screening mammogram in 1 year is recommended.       =======================================   BI-RADS Category 1:  Negative   =======================================             -----          Osman Clemens is a 63 y.o. female evaluated via telephone on 7/10/2020. Consent:  She and/or health care decision maker is aware that she may receive a bill for this telephone service, depending on her insurance coverage, and has provided verbal consent to proceed: Yes      Documentation:  I communicated with the patient and/or health care decision maker about breast ca. Details of this discussion including any medical advice provided: NCCN based FU      I affirm this is a Patient Initiated Episode with an Established Patient who has not had a related appointment within my department in the past 7 days or scheduled within the next 24 hours. I also affirm that the pt is at home and that I, during this visit, am in my office at [facility].       Patient identification was verified at the start of the visit: Yes      Total Time: minutes: 11-20 minutes      Tam Lemmings III       -----      Past Medical History:   Diagnosis Date    Cancer [facility])     left    Carpal tunnel syndrome of right wrist 07/2018    mammogram 11/03/2016    erick Kingsley-scattered fibroglandular tissue bilaterally-follow up spot compression is recommended    Plantar fascia syndrome          Past Surgical History:   Procedure Laterality Date    BREAST SURGERY Left 02/10/2017    Excision left breast anterior superior margins with Dr. Victoriano Doherty Right 11/27/2018    COLONOSCOPY  2012    ENDOMETRIAL ABLATION      WISDOM TOOTH EXTRACTION           Social History     Socioeconomic History    Marital status:      Spouse name: Not on file    Number of children: Not on file    Years of education: Not on file    Highest education level: Not on file   Occupational History    Not on file   Social Needs    Financial resource strain: Not on file    Food insecurity     Worry: Not on file     Inability: Not on file    Transportation needs     Medical: Not on file     Non-medical: Not on file   Tobacco Use    Smoking status: Never Smoker    Smokeless tobacco: Never Used   Substance and Sexual Activity    Alcohol use: No    Drug use: No    Sexual activity: Yes     Partners: Male   Lifestyle    Physical activity     Days per week: Not on file     Minutes per session: Not on file    Stress: Not on file   Relationships    Social connections     Talks on phone: Not on file     Gets together: Not on file     Attends Oriental orthodox service: Not on file     Active member of club or organization: Not on file     Attends meetings of clubs or organizations: Not on file     Relationship status: Not on file    Intimate partner violence     Fear of current or ex partner: Not on file     Emotionally abused: Not on file     Physically abused: Not on file     Forced sexual activity: Not on file   Other Topics Concern    Not on file   Social History Narrative    Lives in [place], works as a groomer.          Family History   Problem Relation Age of Onset    Cancer Father 47        bladder    Cancer Maternal Grandmother [de-identified]        pancreatic    Cancer Maternal Grandfather 79        lung    Cancer Paternal Cousin 54        breast    Cancer Mother 80        CERVICAL CANCER       Allergies:   Dust mite extract and Molds & computerized transcription errors may be present.

## 2020-09-03 ASSESSMENT — ENCOUNTER SYMPTOMS
EYE ITCHING: 0
EYE DISCHARGE: 0
ABDOMINAL PAIN: 0
NAUSEA: 0
VOICE CHANGE: 0
BACK PAIN: 0
WHEEZING: 0
SINUS PRESSURE: 0
CHOKING: 0
RHINORRHEA: 0
SINUS PAIN: 0
SORE THROAT: 0
CHEST TIGHTNESS: 0
SHORTNESS OF BREATH: 0
BLOOD IN STOOL: 0
CONSTIPATION: 0
VOMITING: 0
TROUBLE SWALLOWING: 0
COUGH: 0
ABDOMINAL DISTENTION: 0
DIARRHEA: 0

## 2020-09-03 NOTE — PROGRESS NOTES
Subjective:  History of left DCIS, ER positive. Post lumpectomy; RT; and Aromasin.    -New onset right nipple discharge in October 2017, resolved spontaneously. Some elements of all sections below were copied from my previous note 1/17/19 and have been re-examined and updated where appropriate. All elements reflect the medical decision making of today Deepali 3, 2020  This note was copied forward from the last encounter. Essential components for this patient record were reviewed and verified on this visit including:  recent hospitalizations, recent imaging, PMH, PSH, FH, SOC HX, Allergies, and Medications were reviewed and updated as appropriate. In addition, the assessment and plan were copied from prior office note and updated accordingly. Patient ID: Debbie Del Castillo is a 63 y.o. female. HPI   Patient is here for a clinical follow up with the following history:   History of DCIS of the left breast: She underwent a stereotactic left breast core biopsy at the retroareolar position with clip placement on December 5 , 2016. Pathological evaluation at [facility] demonstrated DCIS. Estrogen Receptor positive. She is experiencing right breast tenderness and concerns over a possible \"new lump\". She underwent right shoulder lipoma excision, removal of abdominal skin tags x3, needle localized lumpectomy on January 12, 2017. Pathological evaluation demonstrated:   A. Soft tissue mass, right shoulder, excision:  Lipoma. B. Skin lesions, excision:  Acrochordons (skin tags), one with small intradermal nevus. C. Left breast tissue, wire-guided excision (lumpectomy): Scattered foci of residual high-grade ductal carcinoma in situ,predominantly solid type with focal comedo necrosis, focally involving anterior (green ink) margin of excision and focally extending to within 1 mm of jarvis-superior (green/red ink) margin of excision.   ER positive (90%, moderate intensity)     She is post left breast lumpectomy margin reexcision, anterio-superior margin, excision stitch granuloma of right shoulder on February 10, 2017. Pathological evaluation demonstrated: Left breast designated \"new superior-anterior margin\", wide local excision: Negative for residual malignancy. Focal fibrocystic changes. Stage 0    Adjuvant radiation treatments to the left breast was completed on 17 for a total dose of 42.56 GY in 16 fractions + boost.    Aromasin 25 mg daily was started 2017. Breast cancer risk factors include family hx on father's side, first cousin. Possibly also that cousin's Mother. Father who was a smoker had bladder cancer. Maternal GF with lung cancer, smoker. MGM with late onset pancreatic cancer. Age of menarche was 14. Patient had a uterine ablation. Patient denies hormonal therapy. Patient is . Age of first live birth was 35. Patient did breast feed.      Estimated body mass index is 30.03 kg/(m^2) as calculated from the following:  Height as of 16: 5' 2\" (1.575 m). Weight as of 16: 164 lb 3.2 oz (74.5 kg). Bra Size: 36C      -She developed right nipple discharge in 2017; resolved spontaneously with no additional incidents to date.     Past Medical History:   Diagnosis Date    Cancer [facility])     left    Carpal tunnel syndrome of right wrist 2018    mammogram 2016    erick Kingsley-scattered fibroglandular tissue bilaterally-follow up spot compression is recommended    Plantar fascia syndrome      Past Surgical History:   Procedure Laterality Date    BREAST SURGERY Left 02/10/2017    Excision left breast anterior superior margins with Dr. Pretty Flanagan Right 2018    COLONOSCOPY  2012    ENDOMETRIAL ABLATION      WISDOM TOOTH EXTRACTION       Allergies   Allergen Reactions    Dust Mite Extract Other (See Comments)     sneezing    Molds & Smuts Other (See Comments)     sorethroat & sneezing     Current Outpatient Medications on File Prior to Visit   Medication Sig Dispense Refill    vitamin D (ERGOCALCIFEROL) 1.25 MG (95978 UT) CAPS capsule TAKE 1 CAPSULE ONCE A WEEK 12 capsule 4    clotrimazole-betamethasone (LOTRISONE) 1-0.05 % cream Apply topically 2 times daily. 45 g 1    Omega-3 1000 MG CAPS Take by mouth daily      MAGNESIUM GLUCONATE PO Take 150 mg by mouth daily      Elastic Bandages & Supports (ACE WRIST BRACE) MISC 1 Units by Does not apply route daily 1 each 0    Evening Primrose Oil 1000 MG CAPS Take 1,000 mg by mouth daily      calcium carbonate (OSCAL) 500 MG TABS tablet Take 500 mg by mouth 2 times daily       exemestane (AROMASIN) 25 MG chemo tablet Take 25 mg by mouth daily      aspirin 81 MG tablet Take 81 mg by mouth three times a week       No current facility-administered medications on file prior to visit. Review of Systems   Constitutional: Negative for activity change, appetite change, chills, fatigue, fever and unexpected weight change. Reports her mother undergoing therapy for cervical cancer and lung tumor (adenoid cystic cancer); the discomfort in the breast, RUQ has spontaneously resolved. HENT: Negative for congestion, postnasal drip, rhinorrhea, sinus pressure, sinus pain, sneezing, sore throat, trouble swallowing and voice change. Eyes: Negative for discharge, itching and visual disturbance. Respiratory: Negative for cough, choking, chest tightness, shortness of breath and wheezing. Cardiovascular: Negative for chest pain, palpitations and leg swelling. Gastrointestinal: Negative for abdominal distention, abdominal pain, blood in stool, constipation, diarrhea, nausea and vomiting. Endocrine: Negative for cold intolerance and heat intolerance. Genitourinary: Negative for difficulty urinating, dysuria, frequency and hematuria. Musculoskeletal: Positive for arthralgias. Negative for back pain, gait problem, joint swelling, myalgias, neck pain and neck stiffness. lymphadenopathy. Abdominal:      General: Bowel sounds are normal. There is no distension. Palpations: Abdomen is soft. Tenderness: There is no abdominal tenderness. There is no guarding or rebound. Musculoskeletal: Normal range of motion. General: No tenderness or deformity. Right shoulder: Normal.      Left shoulder: Normal.   Lymphadenopathy:      Cervical: No cervical adenopathy. Right cervical: No superficial, deep or posterior cervical adenopathy. Left cervical: No superficial, deep or posterior cervical adenopathy. Upper Body:      Right upper body: No pectoral adenopathy. Left upper body: No pectoral adenopathy. Skin:     General: Skin is warm and dry. Coloration: Skin is not pale. Findings: No erythema or rash. Neurological:      Mental Status: She is alert and oriented to person, place, and time. Coordination: Coordination normal.   Psychiatric:         Behavior: Behavior normal.         Thought Content: Thought content normal.         Judgment: Judgment normal.         Assessment:    The patient is here for clinical follow up with following history;    63 y.o. female with stage 0 left breast cancer: On 01/12/2017 she underwent Left breast tissue, wire-guided excision (lumpectomy): Scattered foci of residual high-grade ductal carcinoma in situ,predominantly solid type with focal comedo necrosis, focally involving anterior (green ink) margin of excision and focally extending to within 1 mm of jarvis-superior (green/red ink) margin of excision. ER positive (90%, moderate intensity)     She underwent left breast lumpectomy margin reexcision, anterio-superior margin, excision stitch granuloma of right shoulder on February 10, 2017. Pathological evaluation demonstrated: Left breast designated \"new superior-anterior margin\", wide local excision: Negative for residual malignancy. Focal fibrocystic changes.   Stage 0    -Adjuvant radiation

## 2020-09-08 ENCOUNTER — HOSPITAL ENCOUNTER (OUTPATIENT)
Dept: GENERAL RADIOLOGY | Age: 60
Discharge: HOME | End: 2020-09-10
Payer: COMMERCIAL

## 2020-09-08 ENCOUNTER — OFFICE VISIT (OUTPATIENT)
Dept: BREAST CENTER | Age: 60
End: 2020-09-08
Payer: COMMERCIAL

## 2020-09-08 VITALS
BODY MASS INDEX: 29.26 KG/M2 | WEIGHT: 159 LBS | RESPIRATION RATE: 18 BRPM | HEART RATE: 53 BPM | DIASTOLIC BLOOD PRESSURE: 62 MMHG | HEIGHT: 62 IN | OXYGEN SATURATION: 98 % | SYSTOLIC BLOOD PRESSURE: 114 MMHG | TEMPERATURE: 98.1 F

## 2020-09-08 PROCEDURE — 99213 OFFICE O/P EST LOW 20 MIN: CPT | Performed by: NURSE PRACTITIONER

## 2020-09-08 PROCEDURE — 77066 DX MAMMO INCL CAD BI: CPT

## 2020-09-08 PROCEDURE — 76642 ULTRASOUND BREAST LIMITED: CPT

## 2020-09-08 PROCEDURE — 99213 OFFICE O/P EST LOW 20 MIN: CPT

## 2020-10-15 ENCOUNTER — HOSPITAL ENCOUNTER (OUTPATIENT)
Age: 60
Discharge: HOME | End: 2020-10-17
Payer: COMMERCIAL

## 2020-10-15 PROCEDURE — 88175 CYTOPATH C/V AUTO FLUID REDO: CPT

## 2021-01-19 ENCOUNTER — APPOINTMENT (OUTPATIENT)
Dept: RADIATION ONCOLOGY | Age: 61
End: 2021-01-19
Payer: COMMERCIAL

## 2021-01-21 ENCOUNTER — HOSPITAL ENCOUNTER (OUTPATIENT)
Dept: RADIATION ONCOLOGY | Age: 61
Discharge: HOME | End: 2021-01-21
Payer: COMMERCIAL

## 2021-01-21 VITALS
BODY MASS INDEX: 24.16 KG/M2 | DIASTOLIC BLOOD PRESSURE: 78 MMHG | SYSTOLIC BLOOD PRESSURE: 140 MMHG | OXYGEN SATURATION: 98 % | WEIGHT: 136.4 LBS | RESPIRATION RATE: 18 BRPM | TEMPERATURE: 97.1 F | HEART RATE: 64 BPM

## 2021-01-21 DIAGNOSIS — D05.12 DUCTAL CARCINOMA IN SITU (DCIS) OF LEFT BREAST: Primary | ICD-10-CM

## 2021-01-21 PROCEDURE — 99212 OFFICE O/P EST SF 10 MIN: CPT

## 2021-01-21 PROCEDURE — 99213 OFFICE O/P EST LOW 20 MIN: CPT | Performed by: NURSE PRACTITIONER

## 2021-01-21 NOTE — PROGRESS NOTES
Radiation Oncology   Follow Up Note        1/21/2021    Bony Ambrosia  Vitals:    01/21/21 0909   BP: (!) 140/78   Pulse: 64   Resp: 18   Temp: 97.1 °F (36.2 °C)   SpO2: 98%     Wt Readings from Last 1 Encounters:   01/21/21 136 lb 6.4 oz (61.9 kg)         Jonna Dan MD,      CC: Patient is here for follow up for post-radiation completion. HPI:  Bony Mcarthur is a pleasant 62 y.o. female with a diagnosis of early stage left breast cancer, s/p BCS and XRT. The patient underwent a course of radiation therapy to the left breast, which was completed on 4/28/17. She received 5256 cGy in 21 fractions. States that she is doing well. Appetite good. She has been completing monthly self breast exams. No new skin issues, no lumps/bumps or discharge from the nipple. Last mammogram completed 9/8/20, see results below. Patient is following with Dr Margarita Green for medical oncology. Started Aromasin on 5/1/17. Tolerating well with occasional hot flashes that are triggered with drinking hot coffee as well. Next appt planned for July 2021. Patient is following with Dr Nancy Boucher for breast surgery. Next appt on 3/23/21.     Past Medical History:   Diagnosis Date    Cancer [facility])     left    Carpal tunnel syndrome of right wrist 07/2018    mammogram 11/03/2016    erick Kingsley-scattered fibroglandular tissue bilaterally-follow up spot compression is recommended    Plantar fascia syndrome          Past Surgical History:   Procedure Laterality Date    BREAST SURGERY Left 02/10/2017    Excision left breast anterior superior margins with Dr. Karla Gandhi Right 11/27/2018    COLONOSCOPY  2012    ENDOMETRIAL ABLATION      WISDOM TOOTH EXTRACTION           Social History     Socioeconomic History    Marital status:      Spouse name: Not on file    Number of children: Not on file    Years of education: Not on file    Highest education level: Not on file   Occupational History    Not on file   Social Needs    Financial resource strain: Not on file    Food insecurity     Worry: Not on file     Inability: Not on file    Transportation needs     Medical: Not on file     Non-medical: Not on file   Tobacco Use    Smoking status: Never Smoker    Smokeless tobacco: Never Used   Substance and Sexual Activity    Alcohol use: No    Drug use: No    Sexual activity: Yes     Partners: Male   Lifestyle    Physical activity     Days per week: Not on file     Minutes per session: Not on file    Stress: Not on file   Relationships    Social connections     Talks on phone: Not on file     Gets together: Not on file     Attends Sikh service: Not on file     Active member of club or organization: Not on file     Attends meetings of clubs or organizations: Not on file     Relationship status: Not on file    Intimate partner violence     Fear of current or ex partner: Not on file     Emotionally abused: Not on file     Physically abused: Not on file     Forced sexual activity: Not on file   Other Topics Concern    Not on file   Social History Narrative    Lives in [place], works as a groomer. Family History   Problem Relation Age of Onset    Cancer Father 47        bladder    Cancer Maternal Grandmother [de-identified]        pancreatic    Cancer Maternal Grandfather 79        lung    Cancer Paternal Cousin 54        breast    Cancer Mother 80        CERVICAL CANCER       Allergies:   Dust mite extract and Molds & smuts      Current Outpatient Medications   Medication Sig Dispense Refill    vitamin D (ERGOCALCIFEROL) 1.25 MG (73264 UT) CAPS capsule TAKE 1 CAPSULE ONCE A WEEK 12 capsule 4    clotrimazole-betamethasone (LOTRISONE) 1-0.05 % cream Apply topically 2 times daily.  45 g 1    Omega-3 1000 MG CAPS Take by mouth daily      MAGNESIUM GLUCONATE PO Take 150 mg by mouth daily      Elastic Bandages & Supports (ACE WRIST BRACE) MISC 1 Units by Does not apply route daily 1 each 0    calcium carbonate (OSCAL) 500 MG TABS tablet Take 500 mg by mouth 2 times daily       exemestane (AROMASIN) 25 MG chemo tablet Take 25 mg by mouth daily      aspirin 81 MG tablet Take 81 mg by mouth three times a week       No current facility-administered medications for this encounter. REVIEW OF SYSTEMS:      Constitutional:  No fever chills or rigors. Eyes: No changes in vision, discharge, or pain  ENT: No Headaches, hearing loss or vertigo. No mouth sores or sore throat. No change in taste or smell. Cardiovascular: No chest discomfort, dyspnea on exertion, palpitations or loss of consciousness or phlebitis. Respiratory: Has no cough or wheezing, Has no sputum production. Has no hemoptysis, has no pleuritic pain. Gastrointestinal: No abdominal pain, appetite loss, blood in stools. No change in bowel habits. No hematemesis   Genitourinary: Patient acknowledges no dysuria, trouble voiding, or hematuria. No nocturia or increased frequency. Musculoskeletal: No gait disturbance, weakness or joint complaints. Integumentary: No rash or pruritis. Neurological: No headache, diplopia, change in muscle strength, numbness or tingling. No change in gait, balance, coordination, mood, affect, memory, mentation, behavior. Psychiatric: No anxiety, or depression. Endocrine: No temperature intolerance. No excessive thirst, fluid intake, or urination. No tremor. Hematologic/Lymphatic: No abnormal bruising or bleeding, blood clots or swollen lymph nodes. Allergic/Immunologic: No nasal congestion or hives. PHYSICAL EXAMINATION:   Vitals:    01/21/21 0909   BP: (!) 140/78   Pulse: 64   Resp: 18   Temp: 97.1 °F (36.2 °C)   TempSrc: Skin   SpO2: 98%   Weight: 136 lb 6.4 oz (61.9 kg)     Constitutional: A well developed, well nourished 62 y.o. female who is alert, oriented, cooperative and in no apparent distress. Head was normocephalic and atraumatic. EENT: EOMI ADWOA. MMM.      Neck: Trachea was midline. Respiratory: Chest expansion was symmetrical. Breath sounds bilaterally were clear to auscultation. No wheezes, rhonchi or rales. No intercostal retraction or use of accessory muscles. Cardiovascular: Regular without murmur, clicks, gallops or rubs. Abdomen: Obese, rounded and soft without organomegaly. No rebound, guarding or  rigidity. Lymphatic: No lymphadenopathy. Musculoskeletal: Ambulates without assistance. Normal curvature of the spine. No gross muscle weakness. Muscle size, tone and strength are normal. No involuntary movements. Extremities:  No lower extremity edema, ulcerations, tenderness, varicosities or erythema. Coordination appears adequate. Sensory function appears intact. Skin:  Warm and dry. Examination of color, turgor and pigmentation was relatively normal. No bruises or skin rashes. Old surgical scars are noted. Neurological/Psychiatric: General behavior, level of consciousness, thought content is normal. The patient's emotional status is normal.  Cranial nerves II-XII are grossly intact. Breasts: Right breast appears normal, no suspicious masses, no skin or nipple changes or axillary nodes. Surgical scars noted to left breast, very well healed, and otherwise appears normal, no suspicious masses, no skin or nipple changes or axillary nodes. IMAGING:    MAMMOGRAM, 9/8/20:  Narrative   TISSUE DENSITY:   The breasts are heterogeneously dense (Type 3 density).       MAMMOGRAM FINDINGS:   Finding 1:  No suspicious masses, areas of suspicious architectural distortion, suspicious calcifications, or additional suspicious findings are identified. There are no significant changes from the prior study.       ULTRASOUND FINDINGS:       Finding 1:  Sonography was performed in the right breast using a radial and anti-radial approach.       Finding 2:  No sonographic evidence of suspicious solid or cystic mass lesions.  No focal areas of suspicious atypical echogenicity.       IMPRESSION:   Finding 1:  No mammographic evidence of malignancy.       Finding 2:  There is no sonographic evidence of malignancy.       Screening mammogram in 1 year is recommended.       =======================================   BI-RADS Category 1:  Negative   =======================================           MAMMOGRAM, 11/21/19:  Narrative   TISSUE DENSITY:   The breasts are heterogeneously dense (Type 3 density).       MAMMOGRAM FINDINGS:   Finding 1:  No suspicious masses, areas of suspicious architectural distortion, suspicious calcifications, or additional suspicious findings are identified. There are no significant changes from the prior study.       ULTRASOUND FINDINGS:       Finding 1:  Sonography was performed in the right breast using a radial and anti-radial approach.       Finding 2:  No sonographic evidence of suspicious solid or cystic mass lesions.  No focal areas of suspicious atypical echogenicity.       IMPRESSION:   Finding 1:  No mammographic evidence of malignancy.       Finding 2:  There is no sonographic evidence of malignancy.       Screening mammogram in 1 year is recommended.       =======================================   BI-RADS Category 1:  Negative   =======================================           MAMMOGRAM, 12/31/18:  Narrative   TISSUE DENSITY:   The breasts are heterogeneously dense (Type 3 density).       MAMMOGRAM FINDINGS:   Finding 1:   No suspicious masses, areas of suspicious architectural distortion, suspicious calcifications, or additional suspicious findings are identified.  There are no significant changes from the prior study.       IMPRESSION:   No mammographic evidence of malignancy.       Screening mammogram in 1 year is recommended.       =======================================   BI-RADS Category 1:  Negative   =======================================           MAMMOGRAM, 12/28/17:  Narrative   TISSUE DENSITY:   There are scattered fibroglandular densities (Type 2 density).       MAMMOGRAM FINDINGS:   Finding 1:   There is an area of post-lumpectomy change seen in the left breast.       No suspicious masses, areas of suspicious architectural distortion, suspicious calcifications, or additional suspicious findings are identified.       IMPRESSION:   Area of post-lumpectomy change in the left breast is benign.       Screening mammogram in 1 year is recommended.       =======================================   BI-RADS Category 2:  Benign   =======================================           MAMMOGRAM RIGHT BREAST/ US RIGHT BREAST, 10/30/17:  MAMMOGRAM FINDINGS:       No evidence of mass, architectural distortion, or suspicious calcifications in retroareolar location of the right breast.       ULTRASOUND FINDINGS:       There is an area of duct ectasia seen in the right breast.       No sonographic evidence of suspicious solid or cystic mass lesions.  No focal areas of suspicious atypical echogenicity.       IMPRESSION:       Area of duct ectasia in the right breast. No evidence of solid or cystic retroareolar mass. ASSESSMENT/PLAN:    Early stage left breast cancer, s/p BCS and radiation therapy to the left breast completed on 4/28/17 (5256 cGy in 21 fractions). Patient is doing well post-radiation completion. Reviewed signs and symptoms of recurrence. Continue monthly self breast exams. Imaging per med onc/breast surgery. Last mammogram completed 9/8/20, no evidence of malignancy. Cont to follow with Dr Gisselle Wilson for medical oncology. Started Aromasin on 5/1/17. Tolerating well with occasional hot flashes that are triggered with drinking hot coffee as well. Next appt planned for July 2021. Cont to follow with Dr Jose Carlos Marinelli for breast surgery. Next appt on 3/23/21. I discussed follow up plans with Miguel A Canseco.   At this time, Dr Griselda Simmons will see the patient back on a PRN basis as she is following closely with medical oncology and breast surgery for her cancer care. Instructed to follow up with other providers involved in their care as directed (including but not limited to Medical Oncology, Primary Care, Pulmonary, and Surgery). The patient was given our contact number in the event that if at any time they change their mind and would like to return to the clinic to see either myself or one of the Radiation Oncologists, they can simply call us and we would be happy to see them. Thank you for involving us in the management of this extremely pleasant patient. More than 25 min was in direct contact with pt coordinating/giving care. >50% of the visit was spent in counseling the pt on the following: Follow up care    The nurses notes were reviewed and incorporated into this assessment and plan.           Sincerely,    Nani Dennis, MSN, RN, APRN-CNP  Nurse Practitioner for Radiation Oncology    PHYSICIANS [facility]) [facility]: [phone])  [facility]:  [phone] (D:  [phone])  [address] [place] [address]: [phone] ([facility]: [phone])

## 2021-01-21 NOTE — PROGRESS NOTES
Kavya Morrison  1/21/2021  9:12 AM      Vitals:    01/21/21 0909   BP: (!) 140/78   Pulse: 64   Resp: 18   Temp: 97.1 °F (36.2 °C)   SpO2: 98%    : Wt Readings from Last 3 Encounters:   01/21/21 136 lb 6.4 oz (61.9 kg)   10/15/20 160 lb (72.6 kg)   09/08/20 159 lb (72.1 kg)                Current Outpatient Medications:     vitamin D (ERGOCALCIFEROL) 1.25 MG (41717 UT) CAPS capsule, TAKE 1 CAPSULE ONCE A WEEK, Disp: 12 capsule, Rfl: 4    clotrimazole-betamethasone (LOTRISONE) 1-0.05 % cream, Apply topically 2 times daily. , Disp: 45 g, Rfl: 1    Omega-3 1000 MG CAPS, Take by mouth daily, Disp: , Rfl:     MAGNESIUM GLUCONATE PO, Take 150 mg by mouth daily, Disp: , Rfl:     Elastic Bandages & Supports (ACE WRIST BRACE) MISC, 1 Units by Does not apply route daily, Disp: 1 each, Rfl: 0    calcium carbonate (OSCAL) 500 MG TABS tablet, Take 500 mg by mouth 2 times daily , Disp: , Rfl:     exemestane (AROMASIN) 25 MG chemo tablet, Take 25 mg by mouth daily, Disp: , Rfl:     aspirin 81 MG tablet, Take 81 mg by mouth three times a week, Disp: , Rfl:       Patient is seen today for follow up by [facility]. She was treated with radiation to left whole breast after having a Left breast Lumpectomy 2/10/2017 by Dr. Kaylie Levine. She received radiation 3/30/2017-4/28/2017 with [id number]/[id number] completed. Pt continues to follow up [facility] Dr. Arsen Adam and was last seen 1/11/2020 and continues on aromasin 25mg daily since 5/1/2017. She is here today with no complaints. FALLS RISK SCREENING ASSESSMENT    Instructions:  Assess the patient and enter the appropriate indicators that are present for fall risk identification. Total the numbers entered and assign a fall risk score from Table 2.  Reassess patient at a minimum every 12 weeks or with status change. Assessment   Date  1/21/2021     1. Mental Ability: confusion/cognitively impaired No - 0       2.   Elimination Issues: incontinence, frequency No - 0 3.  Ambulatory: use of assistive devices (walker, cane, off-loading devices), attached to equipment (IV pole, oxygen) No - 0     4. Sensory Limitations: dizziness, vertigo, impaired vision No - 0       5. Age Less than 65 years - 0       6. Medication: diuretics, strong analgesics, hypnotics, sedatives, antihypertensive agents   No - 0   7. Falls:  recent history of falls within the last 3 months (not to include slipping or tripping)   No - 0   TOTAL 0    If score of 4 or greater was education given? No       TABLE 2   Risk Score Risk Level Plan of Care   0-3 Little or  No Risk 1. Provide assistance as indicated for ambulation activities  2. Reorient confused/cognitively impaired patient  3. Call-light/bell within patient's reach  4. Chair/bed in low position, stretcher/bed with siderails up except when performing patient care activities  5. Educate patient/family/caregiver on falls prevention  6.  Reassess in 12 weeks or with any noted change in patient condition which places them at a risk for a fall   4-6 Moderate Risk 1. Provide assistance as indicated for ambulation activities  2. Reorient confused/cognitively impaired patient  3. Call-light/bell within patient's reach  4. Chair/bed in low position, stretcher/bed with siderails up except when performing patient care activities  5. Educate patient/family/caregiver on falls prevention  6. Falls risk precaution (Yellow sticker Level II) placed on patient chart   7 or   Higher High Risk 1. Place patient in easily observable treatment room  2. Patient attended at all times by family member or staff  3. Provide assistance as indicated for ambulation activities  4. Reorient confused/cognitively impaired patient  5. Call-light/bell within patient's reach  6. Chair/bed in low position, stretcher/bed with siderails up except when performing patient care activities  7. Educate patient/family/caregiver on falls prevention  8.   Falls risk precaution (Yellow sticker Level III) placed on patient chart           MALNUTRITION RISK SCREENING ASSESSMENT    1/21/2021   Patient:  Alejandro Cabrera  Sex:  female    Instructions:  Assess the patient and enter the appropriate indicators that are present for nutrition risk identification. Total the numbers entered and assign a risk score. Follow the appropriate action for total score listed below. Assessment   Date  1/21/2021     1. Have you lost weight without trying? 0- No     2. Have you been eating poorly because of a decreased appetite? 0- No   3. Do you have a diagnosis of head and neck cancer?       0- No                                                                                    TOTAL 0          Score of 0-1: No action  Score 2 or greater:  · For Non-Diabetic Patient: Recommend adding Ensure Complete 2 x daily and provide patient with Ensure wellness bag with coupons  · For Diabetic Patient: Recommend adding Glucerna Shake 2 x daily and provide patient with Glucerna Wellness bag with coupons  · Route to the dietitian via [address] [place] [address]

## 2021-03-16 ASSESSMENT — ENCOUNTER SYMPTOMS
SINUS PRESSURE: 0
VOICE CHANGE: 0
RHINORRHEA: 0
ABDOMINAL DISTENTION: 0
SINUS PAIN: 0
EYE ITCHING: 0
TROUBLE SWALLOWING: 0
SHORTNESS OF BREATH: 0
BACK PAIN: 0
EYE DISCHARGE: 0
COUGH: 0
WHEEZING: 0
CHOKING: 0
CHEST TIGHTNESS: 0
ABDOMINAL PAIN: 0
BLOOD IN STOOL: 0
DIARRHEA: 0
SORE THROAT: 0
VOMITING: 0
CONSTIPATION: 0
NAUSEA: 0

## 2021-03-16 NOTE — PROGRESS NOTES
Subjective:  History of left DCIS, ER positive. Post lumpectomy; RT; and Aromasin.    -New onset right nipple discharge in October 2017, resolved spontaneously. This note was copied forward from the last encounter. Essential components for this patient record were reviewed and verified on this visit including:  recent hospitalizations, recent imaging, PMH, PSH, FH, SOC HX, Allergies, and Medications were reviewed and updated as appropriate. In addition, the assessment and plan were copied from prior office note and updated accordingly. Patient ID: Bony Mcarthur is a 62 y.o. female. HPI     Patient is here for a clinical follow up with the following history:   History of DCIS of the left breast: She underwent a stereotactic left breast core biopsy at the retroareolar position with clip placement on December 5 , 2016. Pathological evaluation at [facility] demonstrated DCIS. Estrogen Receptor positive. I see no complaints today. Believes she had Covid infection in about November. Has not had her vaccination yet. She underwent right shoulder lipoma excision, removal of abdominal skin tags x3, needle localized lumpectomy on January 12, 2017. Pathological evaluation demonstrated:   A. Soft tissue mass, right shoulder, excision:  Lipoma. B. Skin lesions, excision:  Acrochordons (skin tags), one with small intradermal nevus. C. Left breast tissue, wire-guided excision (lumpectomy): Scattered foci of residual high-grade ductal carcinoma in situ,predominantly solid type with focal comedo necrosis, focally involving anterior (green ink) margin of excision and focally extending to within 1 mm of jarvis-superior (green/red ink) margin of excision. ER positive (90%, moderate intensity)     She is post left breast lumpectomy margin reexcision, anterio-superior margin, excision stitch granuloma of right shoulder on February 10, 2017.  Pathological evaluation demonstrated: Left breast designated \"new superior-anterior margin\", wide local excision: Negative for residual malignancy. Focal fibrocystic changes. Stage 0    Adjuvant radiation treatments to the left breast was completed on 17 for a total dose of 42.56 GY in 16 fractions + boost.    Aromasin 25 mg daily was started 2017. Patient is tolerating. Breast cancer risk factors include family hx on father's side, first cousin. Possibly also that cousin's Mother. Father who was a smoker had bladder cancer. Maternal GF with lung cancer, smoker. MGM with late onset pancreatic cancer. Age of menarche was 14. Patient had a uterine ablation. Patient denies hormonal therapy. Patient is . Age of first live birth was 35. Patient did breast feed.      Estimated body mass index is 30.03 kg/(m^2) as calculated from the following:  Height as of 16: 5' 2\" (1.575 m). Weight as of 16: 164 lb 3.2 oz (74.5 kg). Bra Size: 36C      -She developed right nipple discharge in 2017; resolved spontaneously with no additional incidents to date.     Past Medical History:   Diagnosis Date    Cancer [facility])     left    Carpal tunnel syndrome of right wrist 2018    mammogram 2016    erick Kingsley-scattered fibroglandular tissue bilaterally-follow up spot compression is recommended    Plantar fascia syndrome      Past Surgical History:   Procedure Laterality Date    BREAST SURGERY Left 02/10/2017    Excision left breast anterior superior margins with Dr. Hans Askew Right 2018    COLONOSCOPY  2012    ENDOMETRIAL ABLATION      WISDOM TOOTH EXTRACTION       Allergies   Allergen Reactions    Dust Mite Extract Other (See Comments)     sneezing    Molds & Smuts Other (See Comments)     sorethroat & sneezing     Current Outpatient Medications on File Prior to Visit   Medication Sig Dispense Refill    vitamin D (ERGOCALCIFEROL) 1.25 MG (45939 UT) CAPS capsule TAKE 1 CAPSULE ONCE A WEEK 12 capsule 0    bruise/bleed easily. Psychiatric/Behavioral: Negative for agitation, confusion and decreased concentration. The patient is not nervous/anxious. Review of systems as noted above completely reviewed with patient. No changes. Objective:   Physical Exam  Vitals signs and nursing note reviewed. Constitutional:       General: She is not in acute distress. Appearance: She is well-developed. She is not diaphoretic. Comments: ECOG stable at 0   HENT:      Head: Normocephalic and atraumatic. Mouth/Throat:      Pharynx: No oropharyngeal exudate. Eyes:      General: No scleral icterus. Right eye: No discharge. Left eye: No discharge. Conjunctiva/sclera: Conjunctivae normal.   Neck:      Musculoskeletal: Normal range of motion and neck supple. Thyroid: No thyromegaly. Vascular: No JVD. Trachea: No tracheal deviation. Cardiovascular:      Rate and Rhythm: Normal rate and regular rhythm. Heart sounds: Normal heart sounds. No murmur. No friction rub. No gallop. Pulmonary:      Effort: Pulmonary effort is normal. No respiratory distress or retractions. Breath sounds: Normal breath sounds. No stridor. No wheezing or rales. Chest:      Chest wall: No mass, lacerations, deformity, swelling, tenderness or edema. Breasts: Breasts are symmetrical.         Right: No inverted nipple, mass, nipple discharge, skin change or tenderness. Left: No inverted nipple, mass, nipple discharge, skin change or tenderness. Comments: No dominant breast masses of concern. Well-healed left lumpectomy incision. No nipple discharge either side. Abdominal:      General: Bowel sounds are normal. There is no distension. Palpations: Abdomen is soft. Tenderness: There is no abdominal tenderness. There is no guarding or rebound. Musculoskeletal: Normal range of motion. General: No tenderness or deformity.       Right shoulder: Normal. Left shoulder: Normal.   Lymphadenopathy:      Cervical: No cervical adenopathy. Right cervical: No superficial, deep or posterior cervical adenopathy. Left cervical: No superficial, deep or posterior cervical adenopathy. Upper Body:      Right upper body: No pectoral adenopathy. Left upper body: No pectoral adenopathy. Skin:     General: Skin is warm and dry. Coloration: Skin is not pale. Findings: No erythema or rash. Neurological:      Mental Status: She is alert and oriented to person, place, and time. Coordination: Coordination normal.   Psychiatric:         Behavior: Behavior normal.         Thought Content: Thought content normal.         Judgment: Judgment normal.         Assessment:    The patient is here for clinical follow up with following history;    62 y.o. female with stage 0 left breast cancer: On 01/12/2017 she underwent Left breast tissue, wire-guided excision (lumpectomy): Scattered foci of residual high-grade ductal carcinoma in situ,predominantly solid type with focal comedo necrosis, focally involving anterior (green ink) margin of excision and focally extending to within 1 mm of jarvis-superior (green/red ink) margin of excision. ER positive (90%, moderate intensity)     She underwent left breast lumpectomy margin reexcision, anterio-superior margin, excision stitch granuloma of right shoulder on February 10, 2017. Pathological evaluation demonstrated: Left breast designated \"new superior-anterior margin\", wide local excision: Negative for residual malignancy. Focal fibrocystic changes. Stage 0    -Adjuvant radiation treatments to the left breast were started on 3/29/17 and completed on 4/28/17 for a total dose of 42.56 GY in 16 fractions + boost.  -Aromasin 25 mg daily was started 05/01/2017 per Dr. Marques Rowley with good tolerance to date. Clinically, she continues to do well and is without evidence of disease recurrence.   -Bilateral diagnostic mammogram and right breast US on 12/21/2019: Negative. -Dexa Bone Density 06/03/2020:  Osteopenia in the lumbar spine and bilateral hips. No statistically significant change. On Ca+/Vit D daily. Continues exercising/walking. September 8, 2020: BILATERAL DIAGNOSTIC MAMMOGRAM, RIGHT ULTRASOUND; [facility]    MAMMOGRAM FINDINGS:    Finding 1:  No suspicious masses, areas of suspicious architectural distortion, suspicious calcifications, or additional suspicious findings are identified. There are no significant changes from the prior study.         ULTRASOUND FINDINGS:         Finding 1:  Sonography was performed in the right breast using a radial and anti-radial approach.         Finding 2:  No sonographic evidence of suspicious solid or cystic mass lesions. No focal areas of suspicious atypical echogenicity.         IMPRESSION:    Finding 1:  No mammographic evidence of malignancy.         Finding 2:  There is no sonographic evidence of malignancy.         Screening mammogram in 1 year is recommended.         =======================================    BI-RADS Category 1:  Negative    =======================================     Imaging reviewed. No evidence of recurrent disease on her imaging from September. Clinically, no evidence of recurrent disease. Plan on office visit and imaging after September 8 of this year. DEXA at same visit. Questions answered to patient satisfaction. Plan:      1. Continue monthly breast self examination. Bring any changes to your physician's attention. 2. Continue Aromasin 25 mg daily as per Medical Oncology/Dr. Larisa Perea. 3. Calcium and vitamin D daily while on Aromasin. 4. Repeat mammogram September of this year. 5. Repeat Bone density September 2021 (same day as mammogram)  6. Continue follow up with Medical Oncology (Dr. Larisa Perea), Radiation Oncology, and Primary Care.   7. RTC 6 months       During today's visit, face-to-face time 25 minutes, greater than 50% in counseling education and coordination of care. All questions were answered to her and her mothers apparent satisfaction, and she is agreeable to the plan as outlined above. Dr. Katie Cristobal.  Camille Hillman MD [facility]  March 23, 2021

## 2021-03-23 ENCOUNTER — OFFICE VISIT (OUTPATIENT)
Dept: BREAST CENTER | Age: 61
End: 2021-03-23
Payer: COMMERCIAL

## 2021-03-23 VITALS
BODY MASS INDEX: 29.81 KG/M2 | RESPIRATION RATE: 20 BRPM | WEIGHT: 162 LBS | DIASTOLIC BLOOD PRESSURE: 68 MMHG | HEART RATE: 57 BPM | OXYGEN SATURATION: 98 % | SYSTOLIC BLOOD PRESSURE: 138 MMHG | HEIGHT: 62 IN | TEMPERATURE: 97 F

## 2021-03-23 DIAGNOSIS — Z79.811 AROMATASE INHIBITOR USE: ICD-10-CM

## 2021-03-23 DIAGNOSIS — Z12.31 ENCOUNTER FOR SCREENING MAMMOGRAM FOR BREAST CANCER: Primary | ICD-10-CM

## 2021-03-23 DIAGNOSIS — Z85.3 HISTORY OF BREAST CANCER: ICD-10-CM

## 2021-03-23 PROCEDURE — 99213 OFFICE O/P EST LOW 20 MIN: CPT | Performed by: SURGERY

## 2021-03-23 NOTE — PATIENT INSTRUCTIONS

## 2021-09-10 ASSESSMENT — ENCOUNTER SYMPTOMS
VOMITING: 0
ABDOMINAL PAIN: 0
TROUBLE SWALLOWING: 0
COUGH: 0
VOICE CHANGE: 0
CONSTIPATION: 0
BACK PAIN: 0
SORE THROAT: 0
WHEEZING: 0
BLOOD IN STOOL: 0
CHOKING: 0
EYE DISCHARGE: 0
ABDOMINAL DISTENTION: 0
RHINORRHEA: 0
SHORTNESS OF BREATH: 0
CHEST TIGHTNESS: 0
NAUSEA: 0
SINUS PRESSURE: 0
SINUS PAIN: 0
DIARRHEA: 0
EYE ITCHING: 0

## 2021-09-10 NOTE — PROGRESS NOTES
Subjective:  History of left DCIS, ER positive. Post lumpectomy; RT; and Aromasin.    -New onset right nipple discharge in October 2017, resolved spontaneously. This note was copied forward from the last encounter. Essential components for this patient record were reviewed and verified on this visit including:  recent hospitalizations, recent imaging, PMH, PSH, FH, SOC HX, Allergies, and Medications were reviewed and updated as appropriate. In addition, the assessment and plan were copied from prior office note and updated accordingly. Patient ID: Evelyn Andrew is a 62 y.o. female. HPI     Patient is here for a clinical follow up with the following history:   History of DCIS of the left breast: She underwent a stereotactic left breast core biopsy at the retroareolar position with clip placement on December 5 , 2016. Pathological evaluation at [facility] demonstrated DCIS. Estrogen Receptor positive. She underwent right shoulder lipoma excision, removal of abdominal skin tags x3, needle localized lumpectomy on January 12, 2017. Pathological evaluation demonstrated:   A. Soft tissue mass, right shoulder, excision:  Lipoma. B. Skin lesions, excision:  Acrochordons (skin tags), one with small intradermal nevus. C. Left breast tissue, wire-guided excision (lumpectomy): Scattered foci of residual high-grade ductal carcinoma in situ,predominantly solid type with focal comedo necrosis, focally involving anterior (green ink) margin of excision and focally extending to within 1 mm of jarvis-superior (green/red ink) margin of excision. ER positive (90%, moderate intensity)     She is post left breast lumpectomy margin reexcision, anterio-superior margin, excision stitch granuloma of right shoulder on February 10, 2017. Pathological evaluation demonstrated: Left breast designated \"new superior-anterior margin\", wide local excision: Negative for residual malignancy.  Focal fibrocystic changes. Stage 0    -2017 completed adjuvant radiation treatments to the left breast.    -2017 started Aromasin 25 mg daily per Medical Oncology, Dr. Hattie Lynch. Age of menarche was 14. Patient had a uterine ablation. Patient denies hormonal therapy. Patient is . Age of first live birth was 35. Patient did breast feed.      -She developed right nipple discharge in 2017; resolved spontaneously with and again, no additional incidents to date. Past Medical History:   Diagnosis Date    Cancer [facility])     left    Carpal tunnel syndrome of right wrist 2018    mammogram 2016    erick Damico [facility] [place]-scattered fibroglandular tissue bilaterally-follow up spot compression is recommended    Plantar fascia syndrome      Past Surgical History:   Procedure Laterality Date    BREAST SURGERY Left 02/10/2017    Excision left breast anterior superior margins with Dr. Crystal Leo Right 2018    COLONOSCOPY  2012    ENDOMETRIAL ABLATION      WISDOM TOOTH EXTRACTION       Allergies   Allergen Reactions    Dust Mite Extract Other (See Comments)     sneezing    Molds & Smuts Other (See Comments)     sorethroat & sneezing     Current Outpatient Medications on File Prior to Visit   Medication Sig Dispense Refill    vitamin D (ERGOCALCIFEROL) 1.25 MG (34421 UT) CAPS capsule TAKE 1 CAPSULE ONCE A WEEK 12 capsule 1    clotrimazole-betamethasone (LOTRISONE) 1-0.05 % cream Apply topically 2 times daily. 45 g 1    Omega-3 1000 MG CAPS Take by mouth daily      MAGNESIUM GLUCONATE PO Take 150 mg by mouth daily      calcium carbonate (OSCAL) 500 MG TABS tablet Take 500 mg by mouth 2 times daily       exemestane (AROMASIN) 25 MG chemo tablet Take 25 mg by mouth daily      aspirin 81 MG tablet Take 81 mg by mouth three times a week       No current facility-administered medications on file prior to visit.      Review of Systems   Constitutional: Negative for activity change, appetite change, chills, fatigue, fever and unexpected weight change. She continues to do well. She has a sister-in-law with newly diagnosed breast cancer as well as her mother with cervical cancer and lung tumor (adenoid cystic cancer). HENT: Negative for congestion, postnasal drip, rhinorrhea, sinus pressure, sinus pain, sneezing, sore throat, trouble swallowing and voice change. Eyes: Negative for discharge, itching and visual disturbance. Respiratory: Negative for cough, choking, chest tightness, shortness of breath and wheezing. Cardiovascular: Negative for chest pain, palpitations and leg swelling. Gastrointestinal: Negative for abdominal distention, abdominal pain, blood in stool, constipation, diarrhea, nausea and vomiting. Endocrine: Negative for cold intolerance and heat intolerance. Genitourinary: Negative for difficulty urinating, dysuria, frequency and hematuria. Musculoskeletal: Positive for arthralgias. Negative for back pain, gait problem, joint swelling, myalgias, neck pain and neck stiffness. Stable and not interfering with her quality of life. Allergic/Immunologic: Negative for environmental allergies and food allergies. Neurological: Negative for dizziness, seizures, syncope, speech difficulty, weakness, light-headedness and headaches. Hematological: Negative for adenopathy. Does not bruise/bleed easily. Psychiatric/Behavioral: Negative for agitation, confusion and decreased concentration. The patient is not nervous/anxious. Objective:   Physical Exam  Vitals and nursing note reviewed. Constitutional:       General: She is not in acute distress. Appearance: She is well-developed. She is not diaphoretic. Comments: ECOG remains stable at 0; pleasant and conversant. HENT:      Head: Normocephalic and atraumatic. Mouth/Throat:      Pharynx: No oropharyngeal exudate. Eyes:      General: No scleral icterus.         Right eye: No discharge. Left eye: No discharge. Conjunctiva/sclera: Conjunctivae normal.   Neck:      Thyroid: No thyromegaly. Vascular: No JVD. Trachea: No tracheal deviation. Cardiovascular:      Rate and Rhythm: Normal rate and regular rhythm. Heart sounds: Normal heart sounds. No murmur heard. No friction rub. No gallop. Pulmonary:      Effort: Pulmonary effort is normal. No respiratory distress or retractions. Breath sounds: Normal breath sounds. No stridor. No wheezing or rales. Chest:      Chest wall: No mass, lacerations, deformity, swelling, tenderness or edema. Breasts: Breasts are symmetrical.         Right: No inverted nipple, mass, nipple discharge, skin change or tenderness. Left: No inverted nipple, mass, nipple discharge, skin change or tenderness. Comments: Right breast exam is unremarkable    Abdominal:      General: Bowel sounds are normal. There is no distension. Palpations: Abdomen is soft. Tenderness: There is no abdominal tenderness. There is no guarding or rebound. Musculoskeletal:         General: No tenderness or deformity. Normal range of motion. Right shoulder: Normal.      Left shoulder: Normal.      Cervical back: Normal range of motion and neck supple. Lymphadenopathy:      Cervical: No cervical adenopathy. Right cervical: No superficial, deep or posterior cervical adenopathy. Left cervical: No superficial, deep or posterior cervical adenopathy. Upper Body:      Right upper body: No pectoral adenopathy. Left upper body: No pectoral adenopathy. Skin:     General: Skin is warm and dry. Coloration: Skin is not pale. Findings: No erythema or rash. Neurological:      Mental Status: She is alert and oriented to person, place, and time. Coordination: Coordination normal.   Psychiatric:         Behavior: Behavior normal.         Thought Content:  Thought content normal.         Judgment: Judgment normal.         Assessment:    The patient is here for clinical follow up with following history;    62 y.o. female with stage 0 left breast cancer: On 01/12/2017 she underwent Left breast tissue, wire-guided excision (lumpectomy): Scattered foci of residual high-grade ductal carcinoma in situ,predominantly solid type with focal comedo necrosis, focally involving anterior (green ink) margin of excision and focally extending to within 1 mm of jarvis-superior (green/red ink) margin of excision. ER positive (90%, moderate intensity)     She underwent left breast lumpectomy margin reexcision, anterio-superior margin, excision stitch granuloma of right shoulder on February 10, 2017. Pathological evaluation demonstrated: Left breast designated \"new superior-anterior margin\", wide local excision: Negative for residual malignancy. Focal fibrocystic changes. Stage 0    -Adjuvant radiation treatments to the left breast were started on 3/29/17 and completed on 4/28/17 for a total dose of 42.56 GY in 16 fractions + boost.  -Aromasin 25 mg daily was started 05/01/2017 per Dr. Hilton Tom with good tolerance to date. Clinically, she continues to do well and is without evidence of disease recurrence. -Bilateral diagnostic mammogram and right breast US on 12/21/2019: Negative. -Dexa Bone Density 06/03/2020:  Osteopenia in the lumbar spine and bilateral hips. No statistically significant change. On Ca+/Vit D daily. Continues exercising/walking.  -09/08/2020 BILATERAL DIAGNOSTIC MAMMOGRAM, RIGHT ULTRASOUND:  Negative, BI-RADS 1.  -09/13/2021 B/L screening mammogram negative, BI-RADS 1.  -09/13/2021 DEXA bone density osteopenia. Final report pending. On calcium and vitamin D daily.  -09/13/2021 Clinical follow up is without evidence of recurrent disease. Imaging reviewed including BI-RADS result.   She continues to tolerate Aromasin well and will complete 5 years of therapy in May 2022; reports Dr. Hilton Tom will discuss on her next visit any benefits for continued endocrine therapy. Plan:      1. Continue monthly breast self examination. Bring any changes to your physician's attention. 2. Continue Aromasin 25 mg daily as per Medical Oncology/Dr. Chico Magana. 3. Calcium and vitamin D daily while on Aromasin. 4. Repeat mammogram September 2022.  5. Repeat Bone density September 2022 (same day as mammogram)  6. Continue follow up with Medical Oncology (Dr. Chico Magana), Radiation Oncology, and Primary Care. 7. RTC 1 year with mammogram/DEXA same day. During today's visit, face-to-face time 25 minutes, greater than 50% in counseling education and coordination of care. All questions were answered to her and her mothers apparent satisfaction, and she is agreeable to the plan as outlined above. Rafiq Valenzuela, RN, MSN, APRN-CNP, [address] [place]  Advanced Oncology Certified Nurse Practitioner  Department of Breast Surgery  [facility] [place]/  [facility] in collaboration with Dr. Jose Luis Restrepo.  [place]/Dr. Kenna Turk

## 2021-09-13 ENCOUNTER — HOSPITAL ENCOUNTER (OUTPATIENT)
Dept: GENERAL RADIOLOGY | Age: 61
Discharge: HOME | End: 2021-09-15
Payer: COMMERCIAL

## 2021-09-13 ENCOUNTER — OFFICE VISIT (OUTPATIENT)
Dept: BREAST CENTER | Age: 61
End: 2021-09-13
Payer: COMMERCIAL

## 2021-09-13 VITALS
RESPIRATION RATE: 16 BRPM | OXYGEN SATURATION: 99 % | BODY MASS INDEX: 28.53 KG/M2 | SYSTOLIC BLOOD PRESSURE: 137 MMHG | TEMPERATURE: 97.7 F | DIASTOLIC BLOOD PRESSURE: 85 MMHG | HEIGHT: 63 IN | WEIGHT: 161 LBS | HEART RATE: 56 BPM

## 2021-09-13 DIAGNOSIS — Z79.811 AROMATASE INHIBITOR USE: ICD-10-CM

## 2021-09-13 DIAGNOSIS — Z85.3 HISTORY OF BREAST CANCER: Primary | ICD-10-CM

## 2021-09-13 DIAGNOSIS — Z12.31 ENCOUNTER FOR SCREENING MAMMOGRAM FOR BREAST CANCER: ICD-10-CM

## 2021-09-13 PROCEDURE — 99212 OFFICE O/P EST SF 10 MIN: CPT | Performed by: NURSE PRACTITIONER

## 2021-09-13 PROCEDURE — 77067 SCR MAMMO BI INCL CAD: CPT

## 2021-09-13 PROCEDURE — 77080 DXA BONE DENSITY AXIAL: CPT

## 2021-09-13 PROCEDURE — 99213 OFFICE O/P EST LOW 20 MIN: CPT

## 2022-07-07 DIAGNOSIS — Z12.31 ENCOUNTER FOR SCREENING MAMMOGRAM FOR BREAST CANCER: ICD-10-CM

## 2022-07-07 DIAGNOSIS — Z79.811 USE OF AROMATASE INHIBITORS: Primary | ICD-10-CM

## 2022-09-22 ASSESSMENT — ENCOUNTER SYMPTOMS
SINUS PAIN: 0
VOICE CHANGE: 0
COUGH: 0
SHORTNESS OF BREATH: 0
TROUBLE SWALLOWING: 0
CHEST TIGHTNESS: 0
DIARRHEA: 0
ABDOMINAL PAIN: 0
EYE ITCHING: 0
VOMITING: 0
WHEEZING: 0
SORE THROAT: 0
NAUSEA: 0
CHOKING: 0
EYE DISCHARGE: 0
ABDOMINAL DISTENTION: 0
BACK PAIN: 0
CONSTIPATION: 0
SINUS PRESSURE: 0
RHINORRHEA: 0
BLOOD IN STOOL: 0

## 2022-09-22 NOTE — PROGRESS NOTES
Subjective:  History of left DCIS, ER positive. Post lumpectomy; RT; and Aromasin.    -New onset right nipple discharge in October 2017, resolved spontaneously. This note was copied forward from the last encounter. Essential components for this patient record were reviewed and verified on this visit including:  recent hospitalizations, recent imaging, PMH, PSH, FH, SOC HX, Allergies, and Medications were reviewed and updated as appropriate. In addition, the assessment and plan were copied from prior office note and updated accordingly. Patient ID: Ni Koo is a 62 y.o. female. HPI   Patient is here for a clinical follow up with the following history:   History of DCIS of the left breast: She underwent a stereotactic left breast core biopsy at the retroareolar position with clip placement on December 5 , 2016. Pathological evaluation at [facility] demonstrated DCIS. Estrogen Receptor positive. She underwent right shoulder lipoma excision, removal of abdominal skin tags x3, needle localized lumpectomy on January 12, 2017. Pathological evaluation demonstrated:   A. Soft tissue mass, right shoulder, excision:  Lipoma. B. Skin lesions, excision:  Acrochordons (skin tags), one with small intradermal nevus. C. Left breast tissue, wire-guided excision (lumpectomy): Scattered foci of residual high-grade ductal carcinoma in situ,predominantly solid type with focal comedo necrosis, focally involving anterior (green ink) margin of excision and focally extending to within 1 mm of jarvis-superior (green/red ink) margin of excision. ER positive (90%, moderate intensity)     She is post left breast lumpectomy margin reexcision, anterio-superior margin, excision stitch granuloma of right shoulder on February 10, 2017. Pathological evaluation demonstrated: Left breast designated \"new superior-anterior margin\", wide local excision: Negative for residual malignancy. Focal fibrocystic changes.   Stage 0    -2017 completed adjuvant radiation treatments to the left breast.    -2017 started Aromasin 25 mg daily per Medical Oncology, Dr. Joanne Bang. Age of menarche was 14. Patient had a uterine ablation. Patient denies hormonal therapy. Patient is . Age of first live birth was 35. Patient did breast feed. -She developed right nipple discharge in 2017; resolved spontaneously with and again, no additional incidents to date. Past Medical History:   Diagnosis Date    Cancer [facility])     left    Carpal tunnel syndrome of right wrist 2018    History of therapeutic radiation     mammogram 2016    erick Kingsley-scattered fibroglandular tissue bilaterally-follow up spot compression is recommended    Plantar fascia syndrome      Past Surgical History:   Procedure Laterality Date    BREAST SURGERY Left 02/10/2017    Excision left breast anterior superior margins with Dr. Yaya Paige Right 2018    COLONOSCOPY  2012    ENDOMETRIAL ABLATION      WISDOM TOOTH EXTRACTION       Allergies   Allergen Reactions    Dust Mite Extract Other (See Comments)     sneezing    Molds & Smuts Other (See Comments)     sorethroat & sneezing     Current Outpatient Medications on File Prior to Visit   Medication Sig Dispense Refill    vitamin D (ERGOCALCIFEROL) 1.25 MG (65607 UT) CAPS capsule TAKE 1 CAPSULE ONCE A WEEK 12 capsule 1    clotrimazole-betamethasone (LOTRISONE) 1-0.05 % cream Apply topically 2 times daily. 45 g 1    Omega-3 1000 MG CAPS Take by mouth daily      MAGNESIUM GLUCONATE PO Take 150 mg by mouth daily      calcium carbonate (OSCAL) 500 MG TABS tablet Take 500 mg by mouth 2 times daily with zinc      exemestane (AROMASIN) 25 MG chemo tablet Take 25 mg by mouth daily       No current facility-administered medications on file prior to visit.      Review of Systems   Constitutional:  Negative for activity change, appetite change, chills, fatigue, fever and unexpected weight change. She continues to do well. She has a sister-in-law with newly diagnosed breast cancer as well as her mother with cervical cancer and lung tumor (adenoid cystic cancer). HENT:  Negative for congestion, postnasal drip, rhinorrhea, sinus pressure, sinus pain, sneezing, sore throat, trouble swallowing and voice change. Eyes:  Negative for discharge, itching and visual disturbance. Respiratory:  Negative for cough, choking, chest tightness, shortness of breath and wheezing. Cardiovascular:  Negative for chest pain, palpitations and leg swelling. Gastrointestinal:  Negative for abdominal distention, abdominal pain, blood in stool, constipation, diarrhea, nausea and vomiting. Endocrine: Negative for cold intolerance and heat intolerance. Genitourinary:  Negative for difficulty urinating, dysuria, frequency and hematuria. Musculoskeletal:  Positive for arthralgias. Negative for back pain, gait problem, joint swelling, myalgias, neck pain and neck stiffness. Stable and not interfering with her quality of life. Allergic/Immunologic: Negative for environmental allergies and food allergies. Neurological:  Negative for dizziness, seizures, syncope, speech difficulty, weakness, light-headedness and headaches. Hematological:  Negative for adenopathy. Does not bruise/bleed easily. Psychiatric/Behavioral:  Negative for agitation, confusion and decreased concentration. The patient is not nervous/anxious. Objective:   Physical Exam  Vitals and nursing note reviewed. Constitutional:       General: She is not in acute distress. Appearance: She is well-developed. She is not diaphoretic. Comments: ECOG remains stable at 0; pleasant and conversant. HENT:      Head: Normocephalic and atraumatic. Mouth/Throat:      Pharynx: No oropharyngeal exudate. Eyes:      General: No scleral icterus. Right eye: No discharge.          Left eye: No normal.         Judgment: Judgment normal.       Assessment:    The patient is here for clinical follow up with following history;    62 y.o. female with stage 0 left breast cancer: On 01/12/2017 she underwent Left breast tissue, wire-guided excision (lumpectomy): Scattered foci of residual high-grade ductal carcinoma in situ,predominantly solid type with focal comedo necrosis, focally involving anterior (green ink) margin of excision and focally extending to within 1 mm of jarvis-superior (green/red ink) margin of excision. ER positive (90%, moderate intensity)     She underwent left breast lumpectomy margin reexcision, anterio-superior margin, excision stitch granuloma of right shoulder on February 10, 2017. Pathological evaluation demonstrated: Left breast designated \"new superior-anterior margin\", wide local excision: Negative for residual malignancy. Focal fibrocystic changes. Stage 0    -Adjuvant radiation treatments to the left breast were started on 3/29/17 and completed on 4/28/17 for a total dose of 42.56 GY in 16 fractions + boost.  -Aromasin 25 mg daily was started 05/01/2017 per Dr. Marcum [facility] with good tolerance to date. Clinically, she continues to do well and is without evidence of disease recurrence. -Bilateral diagnostic mammogram and right breast US on 12/21/2019: Negative. -Dexa Bone Density 06/03/2020:  Osteopenia in the lumbar spine and bilateral hips. No statistically significant change. On Ca+/Vit D daily. Continues exercising/walking.  -09/08/2020 BILATERAL DIAGNOSTIC MAMMOGRAM, RIGHT ULTRASOUND:  Negative, BI-RADS 1.  -09/13/2021 B/L screening mammogram negative, BI-RADS 1.  -09/13/2021 DEXA bone density osteopenia. Final report pending. On calcium and vitamin D daily.  -09/13/2021 Clinical follow up is without evidence of recurrent disease. Imaging reviewed including BI-RADS result.   She continues to tolerate Aromasin well and will complete 5 years of therapy in May 2022; reports Dr. Roberto Curry will discuss on her next visit any benefits for continued endocrine therapy. 9/26/2022: BILATERAL SCREENING MAMMOGRAM  FINDINGS:   Breast tissue is heterogeneously dense which may obscure small masses. No   suspicious mass, microcalcifications, or architectural distortion identified   in either breast.           Impression   No mammographic evidence of malignancy in either breast.       RECOMMENDATION:       Annual bilateral mammogram is advised with consideration for annual bilateral   screening ultrasound given the patient's breast density. BIRADS:   BIRADS - CATEGORY 1       Negative. On magnification, a few tiny new microcalcifications seen that were not visible on last year's imaging. Clinically negative breast exam.  Long discussion about options going forward. Patient would be most comfortable, in light of strong family history, and a repeat diagnostic left mammogram in 6 months with a clinical breast exam.  She is also planning on remaining on exemestane although she has been on it for 5 years. Questions answered to patient's satisfaction. Plan:      Continue monthly breast self examination. Bring any changes to your physician's attention. Continue Aromasin 25 mg daily as per Medical Oncology/Dr. Roberto Curry. Calcium and vitamin D daily while on Aromasin. Repeat diagnostic mammogram six months   Repeat Bone density September 2024   Continue follow up with Medical Oncology (Dr. Roberto Curry), Radiation Oncology, and Primary Care. Return to clinic 6 months with left diagnostic mammogram same day      During today's visit, face-to-face time 35 minutes, greater than 50% in counseling education and coordination of care. All questions were answered to her and her mothers apparent satisfaction, and she is agreeable to the plan as outlined above. Dr. Miya Mosher.  MD Joseph Swanson  9/26/2022

## 2022-09-26 ENCOUNTER — HOSPITAL ENCOUNTER (OUTPATIENT)
Dept: GENERAL RADIOLOGY | Age: 62
Discharge: HOME | End: 2022-09-28
Payer: COMMERCIAL

## 2022-09-26 ENCOUNTER — OFFICE VISIT (OUTPATIENT)
Dept: BREAST CENTER | Age: 62
End: 2022-09-26
Payer: COMMERCIAL

## 2022-09-26 VITALS
TEMPERATURE: 98.3 F | HEART RATE: 56 BPM | HEIGHT: 63 IN | OXYGEN SATURATION: 96 % | RESPIRATION RATE: 12 BRPM | DIASTOLIC BLOOD PRESSURE: 82 MMHG | SYSTOLIC BLOOD PRESSURE: 140 MMHG | BODY MASS INDEX: 27.82 KG/M2 | WEIGHT: 157 LBS

## 2022-09-26 DIAGNOSIS — Z12.31 SCREENING MAMMOGRAM, ENCOUNTER FOR: ICD-10-CM

## 2022-09-26 DIAGNOSIS — Z12.31 ENCOUNTER FOR SCREENING MAMMOGRAM FOR BREAST CANCER: ICD-10-CM

## 2022-09-26 DIAGNOSIS — Z79.811 USE OF AROMATASE INHIBITORS: ICD-10-CM

## 2022-09-26 DIAGNOSIS — Z85.3 PERSONAL HISTORY OF BREAST CANCER: Primary | ICD-10-CM

## 2022-09-26 PROCEDURE — 99213 OFFICE O/P EST LOW 20 MIN: CPT | Performed by: SURGERY

## 2022-09-26 PROCEDURE — 99214 OFFICE O/P EST MOD 30 MIN: CPT | Performed by: SURGERY

## 2022-09-26 PROCEDURE — 77080 DXA BONE DENSITY AXIAL: CPT

## 2022-09-26 PROCEDURE — 77063 BREAST TOMOSYNTHESIS BI: CPT

## 2022-09-26 RX ORDER — SIMVASTATIN 20 MG
TABLET ORAL
COMMUNITY
Start: 2022-09-11

## 2023-03-09 ASSESSMENT — ENCOUNTER SYMPTOMS
ABDOMINAL PAIN: 0
RHINORRHEA: 0
CONSTIPATION: 0
WHEEZING: 0
SHORTNESS OF BREATH: 0
EYE DISCHARGE: 0
BACK PAIN: 0
VOICE CHANGE: 0
TROUBLE SWALLOWING: 0
DIARRHEA: 0
SORE THROAT: 0
CHEST TIGHTNESS: 0
BLOOD IN STOOL: 0
SINUS PAIN: 0
NAUSEA: 0
SINUS PRESSURE: 0
VOMITING: 0
EYE ITCHING: 0
COUGH: 0
ABDOMINAL DISTENTION: 0
CHOKING: 0

## 2023-03-09 NOTE — PROGRESS NOTES
reassess with bilateral screening imaging in 6 months, at time of OV. Plan:      Continue monthly breast self examination. Bring any changes to your physician's attention. Continue Aromasin 25 mg daily as per Medical Oncology/Dr. Anuj Flanagan. Calcium and vitamin D daily while on Aromasin. Repeat diagnostic mammogram six months   Repeat Bone density September 2024   Continue follow up with Medical Oncology (Dr. Anuj Flanagan), Radiation Oncology, and Primary Care. Return to clinic in 6 moths with imaging, bilateral.     During today's visit, face-to-face time 42 minutes, greater than 50% in counseling education and coordination of care. All questions were answered to her and her mothers apparent satisfaction, and she is agreeable to the plan as outlined above. Dr. Shy Jacome.  Lynn Lara MD FACS  3/27/23

## 2023-03-27 ENCOUNTER — OFFICE VISIT (OUTPATIENT)
Dept: BREAST CENTER | Age: 63
End: 2023-03-27
Payer: COMMERCIAL

## 2023-03-27 ENCOUNTER — HOSPITAL ENCOUNTER (OUTPATIENT)
Dept: GENERAL RADIOLOGY | Age: 63
Discharge: HOME | End: 2023-03-29
Payer: COMMERCIAL

## 2023-03-27 VITALS
RESPIRATION RATE: 14 BRPM | SYSTOLIC BLOOD PRESSURE: 136 MMHG | HEART RATE: 53 BPM | OXYGEN SATURATION: 96 % | BODY MASS INDEX: 27.11 KG/M2 | WEIGHT: 153 LBS | TEMPERATURE: 98.3 F | DIASTOLIC BLOOD PRESSURE: 82 MMHG | HEIGHT: 63 IN

## 2023-03-27 DIAGNOSIS — Z85.3 PERSONAL HISTORY OF BREAST CANCER: ICD-10-CM

## 2023-03-27 DIAGNOSIS — Z85.3 PERSONAL HISTORY OF BREAST CANCER: Primary | ICD-10-CM

## 2023-03-27 PROCEDURE — 77065 DX MAMMO INCL CAD UNI: CPT

## 2023-03-27 PROCEDURE — 99213 OFFICE O/P EST LOW 20 MIN: CPT | Performed by: SURGERY

## 2023-03-27 PROCEDURE — 99215 OFFICE O/P EST HI 40 MIN: CPT | Performed by: SURGERY

## 2023-03-27 PROCEDURE — G0279 TOMOSYNTHESIS, MAMMO: HCPCS

## 2023-03-27 RX ORDER — VITAMIN B COMPLEX
1 CAPSULE ORAL DAILY
COMMUNITY

## 2023-04-26 NOTE — PATIENT INSTRUCTIONS
MACK Rowland.  Janett Ricks MD [address] [place] [address]  Radiation Oncology  [place]:  [phone]   FAX:    [id number] [facility] Street: [address] [place] [address] Road:  [phone] Epidermal Closure: running

## 2023-09-21 ASSESSMENT — ENCOUNTER SYMPTOMS
COUGH: 0
SHORTNESS OF BREATH: 0
BACK PAIN: 0

## 2023-09-21 NOTE — PROGRESS NOTES
Summary:  History of left DCIS, ER positive. Post lumpectomy; RT; and Aromasin.    -New onset right nipple discharge in 2017, resolved spontaneously. Unless otherwise stated in this report the patient's positive and negative responses for review of systems for constitutional, eyes, ENT, cardiovascular, respiratory, gastrointestinal, neurological, , musculoskeletal, and integument systems and related systems to the presenting problem are either stated in the history of present illness or were not pertinent or were negative for the symptoms and/or complaints related to the presenting medical problem. Positives and pertinent negatives as per HPI. All others reviewed and are negative. Patient ID: Trinity Rapp is a 65 y.o. female. HPI   Patient is here for a clinical follow up with the following history:   History of DCIS of the left breast:  Menarche was 14. Patient had a uterine ablation. Patient denies hormonal therapy. Patient is . Age of first live birth was 35. Patient did breast feed. 2016 She underwent a stereotactic left breast core biopsy at the retroareolar position with clip placement. Pathological evaluation at [facility] demonstrated DCIS. Estrogen Receptor positive. She underwent right shoulder lipoma excision, removal of abdominal skin tags x3, needle localized lumpectomy on 2017. Pathological evaluation demonstrated:   A. Soft tissue mass, right shoulder, excision:  Lipoma. B. Skin lesions, excision:  Acrochordons (skin tags), one with small intradermal nevus. C. Left breast tissue, wire-guided excision (lumpectomy): Scattered foci of residual high-grade ductal carcinoma in situ,predominantly solid type with focal comedo necrosis, focally involving anterior (green ink) margin of excision and focally extending to within 1 mm of jarvis-superior (green/red ink) margin of excision.   ER positive (90%, moderate intensity)     She is post left

## 2023-09-27 ENCOUNTER — HOSPITAL ENCOUNTER (OUTPATIENT)
Dept: GENERAL RADIOLOGY | Age: 63
Discharge: HOME | End: 2023-09-29
Payer: COMMERCIAL

## 2023-09-27 ENCOUNTER — OFFICE VISIT (OUTPATIENT)
Dept: BREAST CENTER | Age: 63
End: 2023-09-27
Payer: COMMERCIAL

## 2023-09-27 VITALS
BODY MASS INDEX: 27.79 KG/M2 | HEART RATE: 66 BPM | RESPIRATION RATE: 16 BRPM | TEMPERATURE: 97.4 F | WEIGHT: 151 LBS | DIASTOLIC BLOOD PRESSURE: 60 MMHG | SYSTOLIC BLOOD PRESSURE: 118 MMHG | OXYGEN SATURATION: 97 % | HEIGHT: 62 IN

## 2023-09-27 VITALS — BODY MASS INDEX: 27.97 KG/M2 | WEIGHT: 152 LBS | HEIGHT: 62 IN

## 2023-09-27 DIAGNOSIS — Z12.31 SCREENING MAMMOGRAM, ENCOUNTER FOR: ICD-10-CM

## 2023-09-27 DIAGNOSIS — Z85.3 PERSONAL HISTORY OF BREAST CANCER: Primary | ICD-10-CM

## 2023-09-27 PROCEDURE — 77063 BREAST TOMOSYNTHESIS BI: CPT

## 2023-09-27 PROCEDURE — 99213 OFFICE O/P EST LOW 20 MIN: CPT | Performed by: NURSE PRACTITIONER

## 2024-09-20 NOTE — PROGRESS NOTES
negatives as per HPI.  All others reviewed and are negative.      Objective:   Physical Exam  Vitals and nursing note reviewed.   Constitutional:       General: She is not in acute distress.     Appearance: She is well-developed. She is not ill-appearing, toxic-appearing or diaphoretic.      Comments: ECOG 0.  She is pleasant and conversant and in no apparent distress.   HENT:      Head: Normocephalic and atraumatic.      Ears:      Comments: Right inner ear eczema.  She does have cream as needed for flares.     Mouth/Throat:      Pharynx: No oropharyngeal exudate.   Eyes:      General: No scleral icterus.        Right eye: No discharge.         Left eye: No discharge.      Conjunctiva/sclera: Conjunctivae normal.   Neck:      Thyroid: No thyromegaly.      Vascular: No JVD.      Trachea: No tracheal deviation.   Cardiovascular:      Rate and Rhythm: Normal rate and regular rhythm.      Heart sounds: Normal heart sounds. No murmur heard.     No friction rub. No gallop.   Pulmonary:      Effort: Pulmonary effort is normal. No respiratory distress or retractions.      Breath sounds: Normal breath sounds. No stridor. No wheezing or rales.   Chest:      Chest wall: No mass, lacerations, deformity, swelling, tenderness or edema.   Breasts:     Breasts are symmetrical.      Right: No inverted nipple, mass, nipple discharge, skin change or tenderness.      Left: No inverted nipple, mass, nipple discharge, skin change or tenderness.          Comments: Right breast exam is stable and without evidence of malignancy.    Abdominal:      General: Bowel sounds are normal. There is no distension.      Palpations: Abdomen is soft.      Tenderness: There is no abdominal tenderness. There is no guarding or rebound.   Musculoskeletal:         General: No tenderness or deformity. Normal range of motion.      Right shoulder: Normal.      Left shoulder: Normal.      Cervical back: Normal range of motion and neck supple.   Lymphadenopathy:

## 2024-10-01 ENCOUNTER — OFFICE VISIT (OUTPATIENT)
Dept: BREAST CENTER | Age: 64
End: 2024-10-01
Payer: COMMERCIAL

## 2024-10-01 ENCOUNTER — HOSPITAL ENCOUNTER (OUTPATIENT)
Dept: GENERAL RADIOLOGY | Age: 64
Discharge: HOME | End: 2024-10-03
Payer: COMMERCIAL

## 2024-10-01 VITALS
HEART RATE: 56 BPM | RESPIRATION RATE: 12 BRPM | TEMPERATURE: 98.9 F | OXYGEN SATURATION: 98 % | SYSTOLIC BLOOD PRESSURE: 118 MMHG | WEIGHT: 156 LBS | BODY MASS INDEX: 27.64 KG/M2 | HEIGHT: 63 IN | DIASTOLIC BLOOD PRESSURE: 74 MMHG

## 2024-10-01 VITALS — WEIGHT: 155 LBS | BODY MASS INDEX: 27.46 KG/M2

## 2024-10-01 DIAGNOSIS — Z12.31 SCREENING MAMMOGRAM, ENCOUNTER FOR: ICD-10-CM

## 2024-10-01 DIAGNOSIS — Z79.811 AROMATASE INHIBITOR USE: ICD-10-CM

## 2024-10-01 DIAGNOSIS — Z85.3 PERSONAL HISTORY OF BREAST CANCER: Primary | ICD-10-CM

## 2024-10-01 PROCEDURE — 99213 OFFICE O/P EST LOW 20 MIN: CPT | Performed by: NURSE PRACTITIONER

## 2024-10-01 PROCEDURE — 77067 SCR MAMMO BI INCL CAD: CPT

## 2024-10-01 ASSESSMENT — ENCOUNTER SYMPTOMS: GASTROINTESTINAL NEGATIVE: 1

## 2024-10-03 ENCOUNTER — HOSPITAL ENCOUNTER (OUTPATIENT)
Dept: GENERAL RADIOLOGY | Age: 64
Discharge: HOME | End: 2024-10-05
Payer: COMMERCIAL

## 2024-10-03 DIAGNOSIS — Z85.3 PERSONAL HISTORY OF BREAST CANCER: ICD-10-CM

## 2024-10-03 DIAGNOSIS — Z79.811 AROMATASE INHIBITOR USE: ICD-10-CM

## 2024-10-03 PROCEDURE — 77080 DXA BONE DENSITY AXIAL: CPT

## 2024-11-21 ENCOUNTER — TELEPHONE (OUTPATIENT)
Dept: BREAST CENTER | Age: 64
End: 2024-11-21

## 2024-11-21 NOTE — TELEPHONE ENCOUNTER
Spoke to Franca today; She has an apt with Dr. Leon on 11/25/2024.  Copy of mammogram and DEXA bone density faxed to Dr. Leon, Dr. Gil, and Dr. Arellano.    Jazlyn Keita (Terrie), RN, MSN, APRN-CNP, AOCNP  Advanced Oncology Certified Nurse Practitioner  Critical access hospital-Department of Breast Surgery   Office Phone [phone]; Fax [phone]  Located within the [facility]

## 2025-08-25 DIAGNOSIS — Z12.31 VISIT FOR SCREENING MAMMOGRAM: Primary | ICD-10-CM

## 2025-09-03 ENCOUNTER — TRANSCRIBE ORDERS (OUTPATIENT)
Dept: ADMINISTRATIVE | Age: 65
End: 2025-09-03

## 2025-09-03 DIAGNOSIS — H90.3 ASYMMETRIC SNHL (SENSORINEURAL HEARING LOSS): Primary | ICD-10-CM
